# Patient Record
Sex: FEMALE | Race: WHITE | Employment: PART TIME | URBAN - METROPOLITAN AREA
[De-identification: names, ages, dates, MRNs, and addresses within clinical notes are randomized per-mention and may not be internally consistent; named-entity substitution may affect disease eponyms.]

---

## 2019-08-06 ENCOUNTER — OFFICE VISIT (OUTPATIENT)
Dept: ENT CLINIC | Age: 54
End: 2019-08-06
Payer: MEDICARE

## 2019-08-06 ENCOUNTER — PROCEDURE VISIT (OUTPATIENT)
Dept: AUDIOLOGY | Age: 54
End: 2019-08-06
Payer: MEDICARE

## 2019-08-06 VITALS
BODY MASS INDEX: 23 KG/M2 | HEIGHT: 62 IN | HEART RATE: 73 BPM | SYSTOLIC BLOOD PRESSURE: 127 MMHG | DIASTOLIC BLOOD PRESSURE: 67 MMHG | WEIGHT: 125 LBS

## 2019-08-06 DIAGNOSIS — H93.8X3 PRESSURE SENSATION IN BOTH EARS: ICD-10-CM

## 2019-08-06 DIAGNOSIS — R49.0 HOARSENESS: ICD-10-CM

## 2019-08-06 DIAGNOSIS — Z72.0 TOBACCO ABUSE: ICD-10-CM

## 2019-08-06 DIAGNOSIS — H69.83 ETD (EUSTACHIAN TUBE DYSFUNCTION), BILATERAL: Primary | ICD-10-CM

## 2019-08-06 PROCEDURE — 3017F COLORECTAL CA SCREEN DOC REV: CPT | Performed by: OTOLARYNGOLOGY

## 2019-08-06 PROCEDURE — G8427 DOCREV CUR MEDS BY ELIG CLIN: HCPCS | Performed by: OTOLARYNGOLOGY

## 2019-08-06 PROCEDURE — 4004F PT TOBACCO SCREEN RCVD TLK: CPT | Performed by: OTOLARYNGOLOGY

## 2019-08-06 PROCEDURE — 92567 TYMPANOMETRY: CPT | Performed by: AUDIOLOGIST

## 2019-08-06 PROCEDURE — 99204 OFFICE O/P NEW MOD 45 MIN: CPT | Performed by: OTOLARYNGOLOGY

## 2019-08-06 PROCEDURE — G8420 CALC BMI NORM PARAMETERS: HCPCS | Performed by: OTOLARYNGOLOGY

## 2019-08-06 RX ORDER — GABAPENTIN 100 MG/1
CAPSULE ORAL
Refills: 3 | COMMUNITY
Start: 2019-06-18

## 2019-08-06 RX ORDER — CHOLECALCIFEROL (VITAMIN D3) 125 MCG
CAPSULE ORAL
Refills: 3 | COMMUNITY
Start: 2019-07-19 | End: 2021-03-03 | Stop reason: SDUPTHER

## 2019-08-06 RX ORDER — VALACYCLOVIR HYDROCHLORIDE 500 MG/1
TABLET, FILM COATED ORAL
Refills: 3 | COMMUNITY
Start: 2019-05-29 | End: 2021-03-03 | Stop reason: SDUPTHER

## 2019-08-06 RX ORDER — MELOXICAM 15 MG/1
TABLET ORAL
Refills: 12 | COMMUNITY
Start: 2019-07-19 | End: 2021-03-19

## 2019-08-06 NOTE — PROGRESS NOTES
This patient was referred for tympanometric testing by Dr. Cynthia Malone due to bilateral ear pressure. Patient denied hearing loss, tinnitus and vertigo. Tympanometry revealed normal middle ear peak pressure and compliance, bilaterally. Ipsilateral acoustic reflexes were absent, bilaterally at 1000Hz. The results were reviewed with the patient. Recommendations for follow up will be made pending physician consult.     Erlinda Burgess

## 2019-08-06 NOTE — PROGRESS NOTES
Alert and oriented     Endoscopy Procedure Note    Pre-operative Diagnosis: Hoarseness  Post-operative Diagnosis: same  Indications: Hoarseness, dysphagia or aspiration - not able to be clearly evaluated by indirect laryngoscopy  Anesthesia: Lidocaine 4% and Baldemar-Synephrine 1/2%  Endoscopy Type:  Flexible nasopharyngolaryngoscopy  Procedure Details   The right side(s) of the nose was topically anesthetized with spray. The nasal cavities were inspected to determine which side would be more amenable to the scope being passed through. After waiting an appropriate period of time for anesthesia/ vasoconstriction to become effective, the flexible nasopharyngolaryngoscope was passed through the right side(s) of the nose, and the nose, nasopharynx, oropharynx, hypopharynx and larynx were examined. Examination was performed during quiet respiration and with phonation. The following findings were noted. Findings:  Normal nasopharynx, normal epiglottis, normal tongue base, normal pyriform, normal TVC motion and mucosa, no subglottic masses or lesions. Bilateral leukoplakic vs callus anterior TVC lesions. Erythema of supraglottic structures  Condition:  Stable  Complications:  None                          ASSESSMENTAND PLAN:                                                                                                  Diagnosis Orders   1. ETD (Eustachian tube dysfunction), bilateral     2. Hoarseness     3. Tobacco abuse         52 y.o. female presents with ETD as well as dysphonia. NPL showed diffuse mild erythema of supraglottic structures and thinning of vocal cords secondary to tobacco abuse    · Recommend smoking cessation. Patient states she will try  · Start Flonase   · Follow up in 3 month(s) or sooner if symptoms acutely exacerbate    Patient was seen and examined with attending physician, who agrees with the assessment andplans.     Danielle Sheikh DO  Resident Physician  [facility])  Otolaryngology

## 2021-01-18 ENCOUNTER — HOSPITAL ENCOUNTER (EMERGENCY)
Age: 56
Discharge: HOME | End: 2021-01-18
Attending: FAMILY MEDICINE
Payer: COMMERCIAL

## 2021-01-18 VITALS
WEIGHT: 115 LBS | RESPIRATION RATE: 16 BRPM | HEART RATE: 86 BPM | TEMPERATURE: 96.8 F | DIASTOLIC BLOOD PRESSURE: 82 MMHG | HEIGHT: 61 IN | BODY MASS INDEX: 21.71 KG/M2 | SYSTOLIC BLOOD PRESSURE: 142 MMHG | OXYGEN SATURATION: 98 %

## 2021-01-18 DIAGNOSIS — H66.90 ACUTE OTITIS MEDIA, UNSPECIFIED OTITIS MEDIA TYPE: Primary | ICD-10-CM

## 2021-01-18 DIAGNOSIS — H92.03 OTALGIA OF BOTH EARS: ICD-10-CM

## 2021-01-18 PROCEDURE — 99283 EMERGENCY DEPT VISIT LOW MDM: CPT

## 2021-01-18 RX ORDER — BROMPHENIRAMINE MALEATE, PSEUDOEPHEDRINE HYDROCHLORIDE, AND DEXTROMETHORPHAN HYDROBROMIDE 2; 30; 10 MG/5ML; MG/5ML; MG/5ML
5 SYRUP ORAL 4 TIMES DAILY PRN
Qty: 120 ML | Refills: 0 | Status: SHIPPED | OUTPATIENT
Start: 2021-01-18 | End: 2021-01-24

## 2021-01-18 RX ORDER — DOXYCYCLINE HYCLATE 100 MG
100 TABLET ORAL 2 TIMES DAILY
Qty: 20 TABLET | Refills: 0 | Status: SHIPPED | OUTPATIENT
Start: 2021-01-18 | End: 2021-01-28

## 2021-01-18 RX ORDER — IBUPROFEN 800 MG/1
800 TABLET ORAL EVERY 6 HOURS PRN
Qty: 21 TABLET | Refills: 0 | Status: SHIPPED | OUTPATIENT
Start: 2021-01-18 | End: 2021-03-03

## 2021-01-18 ASSESSMENT — PAIN DESCRIPTION - LOCATION: LOCATION: EAR

## 2021-01-18 ASSESSMENT — PAIN DESCRIPTION - PAIN TYPE: TYPE: CHRONIC PAIN

## 2021-01-18 NOTE — ED PROVIDER NOTES
[facility]    HPI: Denisa Orr is a 60 y.o. female with a past medical history of  has a past medical history of Arthritis, Atypical chest pain, Lateral epicondylitis  of elbow, Tendinitis of elbow, Tobacco abuse, and Vitamin D deficiency. presenting with complaints of a bilateral ear pain. The patient states that these symptoms began gradually. The history is obtained from the patient. The patient states that he has had some subjective chills at home. Patient does complain of a mild cough associated with it that is nonproductive. Patient denies excessive fatigue or sleeping greater than 18 hours a day. Patient denies exposure to mononucleosis. The patient denies any abdominal pain, left upper quadrant fullness, or early satiety. The patient also denies difficulty breathing, hemoptysis, neck pain/stiffness, or blurry vision. Sx have persisted and are mildly worse which is what prompted the visit today. No known exposure to sick contacts. Complaining of an onset of bilateral ear pain which she states began approximately 1 year ago. She does have a prescription for neomycin otic drops which she states is . States he typically uses the antibiotic drops as well as an antibiotic. She denies any nasal congestion rhinorrhea, body aches fevers or chills. Denies any concern for Covid exposure.        ROS:   Pertinent positives and negatives are stated within HPI, all other systems reviewed and are negative.      --------------------------------------------- PAST HISTORY ---------------------------------------------  Past Medical History:  has a past medical history of Arthritis, Atypical chest pain, Lateral epicondylitis  of elbow, Tendinitis of elbow, Tobacco abuse, and Vitamin D deficiency. Past Surgical History:  has a past surgical history that includes Appendectomy. Social History:  reports that she has been smoking cigarettes. She has a 15.00 pack-year smoking history.  She has never used smokeless tobacco. She reports current alcohol use. She reports that she does not use drugs. Family History: family history includes Other (age of onset: 48) in her mother. The patients home medications have been reviewed. Allergies: Penicillins    ------------------------- NURSING NOTES AND VITALS REVIEWED ---------------------------   The nursing notes within the ED encounter and vital signs as below have been reviewed by myself. BP (!) 142/82   Pulse 86   Temp 96.8 °F (36 °C) (Temporal)   Resp 16   Ht 5' 1\" (1.549 m)   Wt 115 lb (52.2 kg)   LMP 03/01/2001   SpO2 98%   BMI 21.73 kg/m²   Oxygen Saturation Interpretation: Normal    The patients available past medical records and past encounters were reviewed. Physical exam:  Constitutional: Vital signs are reviewed the patient is comfortable. The patient is alert and oriented and conversant. Head: The head is atraumatic and normocephalic. Eyes: No discharge is present from the eyes. The sclera are normal.  ENT: The oropharynx demonstrates a small amount of erythema bilaterally. There is no tonsillar enlargement nor is there any exudate present. No uvular deviation or edema. No tonsillary asymmetry.  Floor of the mouth soft, no trismus, handling secretions. TM bilaterally demonstrate mild erythema no visible exudate no hemotympanum noted. Neck: Normal range of motion is achieved in the neck. There is no JVD present. No meningeal signs are present   Anterior cervical adenopathy is normal.  Respiratory/chest: The chest is nontender. Breath sounds are normal. There is no respiratory distress.   Cardiovascular: Heart shows a regular rate and rhythm without murmurs clicks or gallops. Abdominal exam: The abdomen is non tender without evidence of peritoneal signs.  Specific attention to the left upper quadrant with palpation of the spleen demonstrates no organomegaly or tenderness  Skin: warm and dry, without rash  Neurologic: GCS

## 2021-03-03 ENCOUNTER — OFFICE VISIT (OUTPATIENT)
Dept: FAMILY MEDICINE CLINIC | Age: 56
End: 2021-03-03
Payer: COMMERCIAL

## 2021-03-03 VITALS — BODY MASS INDEX: 22.34 KG/M2 | OXYGEN SATURATION: 99 % | HEIGHT: 61 IN | WEIGHT: 118.3 LBS | RESPIRATION RATE: 18 BRPM

## 2021-03-03 DIAGNOSIS — B00.1 RECURRENT COLD SORES: ICD-10-CM

## 2021-03-03 DIAGNOSIS — M79.7 FIBROMYALGIA: Primary | ICD-10-CM

## 2021-03-03 DIAGNOSIS — Z72.0 TOBACCO USE: ICD-10-CM

## 2021-03-03 DIAGNOSIS — M54.2 NECK PAIN: ICD-10-CM

## 2021-03-03 DIAGNOSIS — Z12.31 SCREENING MAMMOGRAM, ENCOUNTER FOR: ICD-10-CM

## 2021-03-03 DIAGNOSIS — M25.571 ACUTE RIGHT ANKLE PAIN: ICD-10-CM

## 2021-03-03 DIAGNOSIS — E78.5 HYPERLIPIDEMIA, UNSPECIFIED HYPERLIPIDEMIA TYPE: ICD-10-CM

## 2021-03-03 DIAGNOSIS — E55.9 VITAMIN D DEFICIENCY: ICD-10-CM

## 2021-03-03 PROCEDURE — 99204 OFFICE O/P NEW MOD 45 MIN: CPT | Performed by: FAMILY MEDICINE

## 2021-03-03 PROCEDURE — G8420 CALC BMI NORM PARAMETERS: HCPCS | Performed by: FAMILY MEDICINE

## 2021-03-03 PROCEDURE — 3017F COLORECTAL CA SCREEN DOC REV: CPT | Performed by: FAMILY MEDICINE

## 2021-03-03 PROCEDURE — G8484 FLU IMMUNIZE NO ADMIN: HCPCS | Performed by: FAMILY MEDICINE

## 2021-03-03 PROCEDURE — 4004F PT TOBACCO SCREEN RCVD TLK: CPT | Performed by: FAMILY MEDICINE

## 2021-03-03 PROCEDURE — G8427 DOCREV CUR MEDS BY ELIG CLIN: HCPCS | Performed by: FAMILY MEDICINE

## 2021-03-03 RX ORDER — VALACYCLOVIR HYDROCHLORIDE 500 MG/1
TABLET, FILM COATED ORAL
Qty: 30 TABLET | Refills: 3 | Status: SHIPPED
Start: 2021-03-03 | End: 2021-07-08 | Stop reason: SDUPTHER

## 2021-03-03 RX ORDER — ALBUTEROL SULFATE 90 UG/1
2 AEROSOL, METERED RESPIRATORY (INHALATION) 4 TIMES DAILY PRN
Qty: 1 INHALER | Refills: 5 | Status: SHIPPED
Start: 2021-03-03 | End: 2021-07-08 | Stop reason: SDUPTHER

## 2021-03-03 RX ORDER — CHOLECALCIFEROL (VITAMIN D3) 125 MCG
CAPSULE ORAL
Qty: 30 TABLET | Refills: 3 | Status: SHIPPED
Start: 2021-03-03 | End: 2021-07-08 | Stop reason: SDUPTHER

## 2021-03-03 SDOH — ECONOMIC STABILITY: INCOME INSECURITY: HOW HARD IS IT FOR YOU TO PAY FOR THE VERY BASICS LIKE FOOD, HOUSING, MEDICAL CARE, AND HEATING?: NOT HARD AT ALL

## 2021-03-03 SDOH — ECONOMIC STABILITY: FOOD INSECURITY: WITHIN THE PAST 12 MONTHS, THE FOOD YOU BOUGHT JUST DIDN'T LAST AND YOU DIDN'T HAVE MONEY TO GET MORE.: NEVER TRUE

## 2021-03-03 ASSESSMENT — PATIENT HEALTH QUESTIONNAIRE - PHQ9
SUM OF ALL RESPONSES TO PHQ QUESTIONS 1-9: 2
2. FEELING DOWN, DEPRESSED OR HOPELESS: 1

## 2021-03-03 NOTE — PROGRESS NOTES
Natali Lindsey  : 1965    Chief Complaint:     Chief Complaint   Patient presents with   Jovita Blank Doctor    Ankle Pain     right ankle injury    Neck Pain     small cracks in neck, not too much pain       HPI  Natali Lindsey 60 y.o. presents for   Chief Complaint   Patient presents with   Jovita Blank Doctor    Ankle Pain     right ankle injury    Neck Pain     small cracks in neck, not too much pain     Patient presents as a new patient to establish care. She states that she hurt her ankle about a week ago where it twisted. She states that it is black and blue. It does hurt however she has been taking good care of it. She also has a history of fibromyalgia and does follow with rheumatology. She also has a history of cold sores and usually takes Valtrex on a daily basis to prevent these. She also admits to some neck pain that comes and goes. It is slightly improved recently. She also does smoke does not wish to quit at this time. She has not had blood work in some time. She has had a history of high cholesterol and vitamin D deficiency. She does take vitamin D on a daily basis    All questions were answered to patients satisfaction.     Past Medical History:   Diagnosis Date    Arthritis     Atypical chest pain 2011    Lateral epicondylitis  of elbow 2011    Tendinitis of elbow 2011    Tobacco abuse 2011    Vitamin D deficiency 2011       Past Surgical History:   Procedure Laterality Date    APPENDECTOMY         Social History     Socioeconomic History    Marital status:      Spouse name: None    Number of children: None    Years of education: None    Highest education level: None   Occupational History    None   Social Needs    Financial resource strain: Not hard at all   [place] insecurity     Worry: Never true     Inability: Never true    Transportation needs     Medical: No     Non-medical: No   Tobacco Use    Smoking status: Current Every Day Smoker     Packs/day: 0.50     Years: 30.00     Pack years: 15.00     Types: Cigarettes    Smokeless tobacco: Never Used   Substance and Sexual Activity    Alcohol use: Yes     Comment: occasional beer    Drug use: No    Sexual activity: None   Lifestyle    Physical activity     Days per week: None     Minutes per session: None    Stress: None   Relationships    Social connections     Talks on phone: None     Gets together: None     Attends Zoroastrian service: None     Active member of club or organization: None     Attends meetings of clubs or organizations: None     Relationship status: None    Intimate partner violence     Fear of current or ex partner: None     Emotionally abused: None     Physically abused: None     Forced sexual activity: None   Other Topics Concern    None   Social History Narrative    None       Family History   Problem Relation Age of Onset    Other Mother 48         of MI at age of 48, Lupus          Current Outpatient Medications   Medication Sig Dispense Refill    valACYclovir (VALTREX) 500 MG tablet Take one tab po daily 30 tablet 3    Cholecalciferol (VITAMIN D3) 50 MCG (2000 UT) TABS TK 1 T PO QD 30 tablet 3    albuterol sulfate HFA (VENTOLIN HFA) 108 (90 Base) MCG/ACT inhaler Inhale 2 puffs into the lungs 4 times daily as needed for Wheezing 1 Inhaler 5    gabapentin (NEURONTIN) 100 MG capsule TK 2 CS PO TID  3    meloxicam (MOBIC) 15 MG tablet TK 1 T PO QD  12    methotrexate (RHEUMATREX) 2.5 MG chemo tablet TK 6 TS PO 1 TIME Q WK  4    naproxen (NAPROSYN) 500 MG tablet Take 1 tablet by mouth 2 times daily for 10 days. 20 tablet 0    aspirin 81 MG tablet Take 162 mg by mouth daily.  cyclobenzaprine (FLEXERIL) 10 MG tablet Take 10 mg by mouth 3 times daily as needed for Muscle spasms. No current facility-administered medications for this visit.         Allergies   Allergen Reactions    [employer] Abdomen is soft. Tenderness: There is no abdominal tenderness. Musculoskeletal: Normal range of motion. General: Tenderness (Right ankle slightly edematous and painful to palpation with ecchymoses noted) present. Lymphadenopathy:      Cervical: No cervical adenopathy. Skin:     General: Skin is warm and dry. Findings: No rash. Neurological:      Mental Status: She is alert and oriented to person, place, and time. Deep Tendon Reflexes: Reflexes are normal and symmetric. Psychiatric:         Behavior: Behavior normal.              Laboratory: All laboratory and radiology results have been personally reviewed by myself    Lab Results   Component Value Date     11/24/2014    K 4.8 11/24/2014     11/24/2014    CO2 27 11/24/2014    BUN 10 11/24/2014    CREATININE 0.5 11/24/2014    PROT 7.4 11/24/2014    LABALBU 4.4 11/24/2014    CALCIUM 9.8 11/24/2014    GFRAA >60 11/24/2014    LABGLOM >60 11/24/2014    GLUCOSE 105 11/24/2014    AST 22 11/24/2014    ALT 36 11/24/2014    ALKPHOS 82 11/24/2014    BILITOT 0.2 11/24/2014    TSH 1.510 10/03/2014    VITD25 18 10/03/2014    CHOL 234 10/03/2014    TRIG 209 10/03/2014    HDL 79 10/03/2014    LDLCALC 113 10/03/2014        Lab Results   Component Value Date    CHOL 234 (H) 10/03/2014     Lab Results   Component Value Date    TRIG 209 (H) 10/03/2014     Lab Results   Component Value Date    HDL 79 10/03/2014     Lab Results   Component Value Date    LDLCALC 113 (H) 10/03/2014       No results found for: LABA1C  Lab Results   Component Value Date    LDLCALC 113 (H) 10/03/2014    CREATININE 0.5 11/24/2014       ASSESSMENT/PLAN:     Diagnosis Orders   1. Fibromyalgia     2. Vitamin D deficiency  Vitamin D 25 Hydroxy   3. Hyperlipidemia, unspecified hyperlipidemia type  Lipid Panel   4. Recurrent cold sores     5. Acute right ankle pain  XR ANKLE RIGHT (MIN 3 VIEWS)   6. Neck pain  XR CERVICAL SPINE (2-3 VIEWS)   7. Tobacco use     8. Screening mammogram, encounter for  VASILIY DIGITAL SCREEN BILATERAL PER PROTOCOL     Likely does have a component of COPD as her lung exam does show significant wheezing. Albuterol inhaler given today to use as needed. She does not wish for a daily inhaler at this time. We did discuss smoking cessation she does not wish to quit at this time. As for fibromyalgia continue to follow with rheumatology as directed. Valtrex given for cold sores. Labs to be completed at her convenience. X-ray of the right ankle as well as cervical spine to be completed at her convenience as well. Mammogram ordered. Patient will return in 1 month or sooner if needed    Problem list reviewed andsimplified/updated  HM reviewed today and counseled as appropriate    Call or go to ED immediately if symptoms worsen or persist.  Future Appointments   Date Time Provider Adam Root   4/6/2021  2:45 PM [facility], [facility]     Or sooner if necessary. Educational materials and/or homeexercises printed for patient's review and were included in patient instructions on his/her After Visit Summary and given to patient at the end of visit.       Counseled regarding above diagnosis, including possible risks and complications,  especially if left uncontrolled.     Counseled regarding the possible side effects, risks, benefits and alternatives to treatment; patient and/or guardian verbalizes understanding, agrees,feels comfortable with and wishes to proceed with above treatment plan.     Advised patient to call Nolon Rings new medication issues, and read all Rx info from pharmacy to assure aware of all possible risks and side effects of medication before taking.     Reviewed age and gender appropriate health screening exams and vaccinations.   Advised patient regarding importance of keeping up with recommended health maintenance and toschedule as soon as possible if overdue, as this is important in assessing for undiagnosed

## 2021-03-04 ENCOUNTER — HOSPITAL ENCOUNTER (EMERGENCY)
Age: 56
Discharge: HOME | End: 2021-03-04
Payer: COMMERCIAL

## 2021-03-04 ENCOUNTER — HOSPITAL ENCOUNTER (OUTPATIENT)
Age: 56
Discharge: HOME | End: 2021-03-06
Payer: COMMERCIAL

## 2021-03-04 ENCOUNTER — HOSPITAL ENCOUNTER (OUTPATIENT)
Dept: GENERAL RADIOLOGY | Age: 56
Discharge: HOME | End: 2021-03-06
Payer: COMMERCIAL

## 2021-03-04 VITALS
WEIGHT: 109 LBS | OXYGEN SATURATION: 100 % | HEIGHT: 62 IN | HEART RATE: 55 BPM | BODY MASS INDEX: 20.06 KG/M2 | DIASTOLIC BLOOD PRESSURE: 89 MMHG | RESPIRATION RATE: 16 BRPM | SYSTOLIC BLOOD PRESSURE: 138 MMHG | TEMPERATURE: 97.1 F

## 2021-03-04 DIAGNOSIS — M54.2 NECK PAIN: ICD-10-CM

## 2021-03-04 DIAGNOSIS — M25.571 ACUTE RIGHT ANKLE PAIN: ICD-10-CM

## 2021-03-04 DIAGNOSIS — S82.831A CLOSED FRACTURE OF DISTAL END OF RIGHT FIBULA, UNSPECIFIED FRACTURE MORPHOLOGY, INITIAL ENCOUNTER: Primary | ICD-10-CM

## 2021-03-04 PROCEDURE — 99284 EMERGENCY DEPT VISIT MOD MDM: CPT

## 2021-03-04 PROCEDURE — 72040 X-RAY EXAM NECK SPINE 2-3 VW: CPT

## 2021-03-04 PROCEDURE — 73610 X-RAY EXAM OF ANKLE: CPT

## 2021-03-04 RX ORDER — NAPROXEN 500 MG/1
500 TABLET ORAL 2 TIMES DAILY
Qty: 20 TABLET | Refills: 0 | Status: SHIPPED | OUTPATIENT
Start: 2021-03-04 | End: 2021-04-06 | Stop reason: ALTCHOICE

## 2021-03-04 ASSESSMENT — ENCOUNTER SYMPTOMS
BACK PAIN: 0
CONSTIPATION: 0
SORE THROAT: 0
SHORTNESS OF BREATH: 0
SINUS PRESSURE: 0
BACK PAIN: 0
COUGH: 0
ABDOMINAL PAIN: 0
COUGH: 0
SHORTNESS OF BREATH: 0
DIARRHEA: 0
EYE PAIN: 0
COLOR CHANGE: 0
CHEST TIGHTNESS: 0

## 2021-03-04 ASSESSMENT — PAIN DESCRIPTION - DESCRIPTORS: DESCRIPTORS: ACHING;SHARP

## 2021-03-04 ASSESSMENT — PAIN DESCRIPTION - PAIN TYPE: TYPE: ACUTE PAIN

## 2021-03-04 ASSESSMENT — PAIN DESCRIPTION - LOCATION: LOCATION: ANKLE

## 2021-03-04 NOTE — ED PROVIDER NOTES
[facility]        Department of Emergency Medicine   ED  Provider Note  Admit Date/RoomTime: 3/4/2021  2:50 PM  ED Room: 03/03  HPI:  3/4/21, Time: 5:19 PM EST      The patient is a 66-year-old female sent to the emergency department by her primary care physician due to concerns for right ankle fracture. Patient states a week ago she \"got her leg stuck in a snow bank\" at the [place]. She states she thought she just sprained it and has been walking on it since. She states it bruised from her ankle all the way down to her toes. Patient states she had an appointment with her PCP today and had an outpatient x-ray which showed a fracture. Patient has been walking on it without issue and has not been take anything for pain. She denies any redness, warmth, numbness or tingling in the toes, coolness to the toes, foot pain. The history is provided by the patient. No  was used. REVIEW OF SYSTEMS:  Review of Systems   Constitutional: Negative for activity change, chills, fatigue and fever. Respiratory: Negative for cough, chest tightness and shortness of breath. Cardiovascular: Negative for chest pain, palpitations and leg swelling. Musculoskeletal: Positive for arthralgias and joint swelling. Negative for back pain, myalgias, neck pain and neck stiffness. Skin: Negative for color change, pallor and rash. Neurological: Negative for dizziness, weakness, light-headedness and headaches. Psychiatric/Behavioral: Negative for agitation, behavioral problems and confusion. Pertinent positives and negatives are stated within HPI, all other systems reviewed and are negative.      --------------------------------------------- PAST HISTORY ---------------------------------------------  Past Medical History:  has a past medical history of Arthritis, Atypical chest pain, Lateral epicondylitis  of elbow, Tendinitis of elbow, Tobacco abuse, and Vitamin D deficiency. Past Surgical History:  has a past surgical history that includes Appendectomy. Social History:  reports that she has been smoking cigarettes. She has a 15.00 pack-year smoking history. She has never used smokeless tobacco. She reports current alcohol use. She reports that she does not use drugs. Family History: family history includes Other (age of onset: 48) in her mother. The patients home medications have been reviewed. Allergies: Penicillins    -------------------------------------------------- RESULTS -------------------------------------------------  All laboratory and radiology results have been personally reviewed by myself   LABS:  No results found for this visit on 03/04/21. RADIOLOGY:  Interpreted by Radiologist.  No orders to display       ------------------------- NURSING NOTES AND VITALS REVIEWED ---------------------------   The nursing notes within the ED encounter and vital signs as below have been reviewed. /89   Pulse 55   Temp 97.1 °F (36.2 °C) (Temporal)   Resp 16   Ht 5' 2\" (1.575 m)   Wt 109 lb (49.4 kg)   LMP 03/01/2001   SpO2 100%   BMI 19.94 kg/m²   Oxygen Saturation Interpretation: Normal      ---------------------------------------------------PHYSICAL EXAM--------------------------------------    Physical Exam  Vitals signs and nursing note reviewed. Constitutional:       General: She is not in acute distress. Appearance: Normal appearance. She is well-developed. She is not ill-appearing. HENT:      Head: Normocephalic and atraumatic. Mouth/Throat:      Pharynx: Oropharynx is clear. Neck:      Vascular: No JVD. Trachea: No tracheal deviation. Cardiovascular:      Rate and Rhythm: Normal rate and regular rhythm. Heart sounds: Normal heart sounds. No murmur. Pulmonary:      Effort: Pulmonary effort is normal. No respiratory distress. Breath sounds: Normal breath sounds. Musculoskeletal: Normal range of motion. General: Swelling and tenderness present. No deformity. Comments: Tenderness and edema over the right lateral malleolus with extensive aging ecchymosis from the ankle to the distal foot sparing the toes. 2+ DP pulse. Normal cap refill. Full range of motion of the ankle without crepitus, laxity or deformity. Skin:     General: Skin is warm and dry. Capillary Refill: Capillary refill takes less than 2 seconds. Coloration: Skin is not pale. Findings: No erythema. Neurological:      Mental Status: She is alert and oriented to person, place, and time. Mental status is at baseline. Motor: No weakness. Psychiatric:         Mood and Affect: Mood normal.         Behavior: Behavior normal.         Thought Content: Thought content normal.            ------------------------------ ED COURSE/MEDICAL DECISION MAKING----------------------  Medications - No data to display      ED COURSE:      No orders to display         Procedures:  Procedures     Medical Decision Making:   MDM   58-year-old female sent to the emergency department by her PCP for a distal fibula fracture seen on outpatient x-ray from earlier today. Patient sustained the injury over a week ago. She has no signs of neurovascular compromise. The x-ray does show a slightly displaced distal fibular fracture. Patient was placed in a posterior short leg splint and given crutches although she is adamant that she will not use the crutches and she will be walking on it as she is worried she will fall using crutches. Patient is given a prescription for naproxen for the pain and encouraged to follow-up with her podiatrist as soon as possible. Patient discharged home in good condition.       Counseling: The emergency provider has spoken with the patient and discussed todays results, in addition to providing specific details for the plan of care and counseling regarding the diagnosis and prognosis. Questions are answered at this time and they are agreeable with the plan.      --------------------------------- IMPRESSION AND DISPOSITION ---------------------------------    IMPRESSION  1. Closed fracture of distal end of right fibula, unspecified fracture morphology, initial encounter        DISPOSITION  Disposition: Discharge to home  Patient condition is good      Electronically signed by Karin Weeks PA-C   DD: 3/4/21  **This report was transcribed using voice recognition software. Every effort was made to ensure accuracy; however, inadvertent computerized transcription errors may be present.   END OF ED PROVIDER NOTE         Karin Weeks PA-C  03/04/21 [id number]

## 2021-03-05 ENCOUNTER — TELEPHONE (OUTPATIENT)
Dept: FAMILY MEDICINE CLINIC | Age: 56
End: 2021-03-05

## 2021-03-18 ENCOUNTER — TELEPHONE (OUTPATIENT)
Dept: FAMILY MEDICINE CLINIC | Age: 56
End: 2021-03-18

## 2021-03-18 NOTE — TELEPHONE ENCOUNTER
Pt's sister calling and states pt is having severe neck pain and can't move her head. Provider not in office advised ER. Voiced understanding.

## 2021-03-19 ENCOUNTER — HOSPITAL ENCOUNTER (EMERGENCY)
Age: 56
Discharge: HOME | End: 2021-03-19
Attending: EMERGENCY MEDICINE
Payer: COMMERCIAL

## 2021-03-19 VITALS
HEART RATE: 89 BPM | DIASTOLIC BLOOD PRESSURE: 82 MMHG | HEIGHT: 62 IN | RESPIRATION RATE: 16 BRPM | TEMPERATURE: 96.6 F | OXYGEN SATURATION: 95 % | WEIGHT: 110 LBS | SYSTOLIC BLOOD PRESSURE: 130 MMHG | BODY MASS INDEX: 20.24 KG/M2

## 2021-03-19 DIAGNOSIS — M62.838 NECK MUSCLE SPASM: Primary | ICD-10-CM

## 2021-03-19 DIAGNOSIS — M47.812 CERVICAL SPINE ARTHRITIS: ICD-10-CM

## 2021-03-19 PROCEDURE — 6360000002 HC RX W HCPCS: Performed by: EMERGENCY MEDICINE

## 2021-03-19 PROCEDURE — 96372 THER/PROPH/DIAG INJ SC/IM: CPT

## 2021-03-19 PROCEDURE — 99283 EMERGENCY DEPT VISIT LOW MDM: CPT

## 2021-03-19 RX ORDER — IBUPROFEN 800 MG/1
800 TABLET ORAL EVERY 8 HOURS PRN
Qty: 12 TABLET | Refills: 0 | Status: SHIPPED | OUTPATIENT
Start: 2021-03-19 | End: 2021-04-06 | Stop reason: ALTCHOICE

## 2021-03-19 RX ORDER — ORPHENADRINE CITRATE 30 MG/ML
60 INJECTION INTRAMUSCULAR; INTRAVENOUS ONCE
Status: COMPLETED | OUTPATIENT
Start: 2021-03-19 | End: 2021-03-19

## 2021-03-19 RX ORDER — CYCLOBENZAPRINE HCL 10 MG
10 TABLET ORAL NIGHTLY PRN
Qty: 10 TABLET | Refills: 0 | Status: SHIPPED | OUTPATIENT
Start: 2021-03-19 | End: 2021-04-06

## 2021-03-19 RX ORDER — KETOROLAC TROMETHAMINE 30 MG/ML
30 INJECTION, SOLUTION INTRAMUSCULAR; INTRAVENOUS ONCE
Status: COMPLETED | OUTPATIENT
Start: 2021-03-19 | End: 2021-03-19

## 2021-03-19 RX ADMIN — KETOROLAC TROMETHAMINE 30 MG: 30 INJECTION, SOLUTION INTRAMUSCULAR at 08:30

## 2021-03-19 RX ADMIN — ORPHENADRINE CITRATE 60 MG: 60 INJECTION INTRAMUSCULAR; INTRAVENOUS at 08:30

## 2021-03-19 ASSESSMENT — PAIN DESCRIPTION - ONSET: ONSET: ON-GOING

## 2021-03-19 ASSESSMENT — PAIN DESCRIPTION - DESCRIPTORS: DESCRIPTORS: DISCOMFORT

## 2021-03-19 ASSESSMENT — PAIN DESCRIPTION - FREQUENCY: FREQUENCY: CONTINUOUS

## 2021-03-19 ASSESSMENT — PAIN SCALES - GENERAL
PAINLEVEL_OUTOF10: 8
PAINLEVEL_OUTOF10: 8

## 2021-03-19 ASSESSMENT — PAIN DESCRIPTION - PAIN TYPE: TYPE: ACUTE PAIN

## 2021-03-19 ASSESSMENT — PAIN DESCRIPTION - ORIENTATION: ORIENTATION: RIGHT;LEFT

## 2021-03-19 ASSESSMENT — PAIN - FUNCTIONAL ASSESSMENT: PAIN_FUNCTIONAL_ASSESSMENT: PREVENTS OR INTERFERES SOME ACTIVE ACTIVITIES AND ADLS

## 2021-03-19 NOTE — ED PROVIDER NOTES
HPI:  3/19/21, Time: 8:27 AM EDT         Venancio Sunshine is a 60 y.o. female presenting to the ED for neck pain (bilateral) of a chronic nature, but worse in past 2 days. Patient states 2 days ago she awoke with bilateral neck spasm and pain, worse if she moves left to right. She hasn't taken anything for it over the counter. Was taking Ultram given to her for her ankle fracture by podiatrist but she is out of that now. She saw her PCP for neck pain a couple weeks ago and had an xray that showed arthritis. She also sees a Rheumatologist for arthritis and fibromyalgia. She denies trauma, heavy lifting, fever or chills, headache, nausea or vomiting, focal paresthesias, focal weakness, neck or back pain, sore throat, infectious symptoms or other complaints. States she needs pain medication and has a ride home, wants paper prescriptions today because her sister is driving to the other side of [facility]. The complaint has been persistent, moderate in severity, and worsened by movement. Patient denies fever/chills, sore throat, cough, congestion, chest pain, shortness of breath, edema, headache, visual disturbances, focal paresthesias, focal weakness, abdominal pain, nausea, vomiting, diarrhea, constipation, dysuria, hematuria, trauma, back pain, rash or other complaints. ROS:   A complete review of systems was performed and all pertinent positives and negatives are stated within HPI, all other systems reviewed and are negative.      --------------------------------------------- PAST HISTORY ---------------------------------------------  Past Medical History:  has a past medical history of Arthritis, Atypical chest pain, Fibromyalgia, Lateral epicondylitis  of elbow, Tendinitis of elbow, Tobacco abuse, and Vitamin D deficiency. Past Surgical History:  has a past surgical history that includes Appendectomy. Social History:  reports that she has been smoking cigarettes.  She has a 15.00 pack-year smoking history. She has never used smokeless tobacco. She reports current alcohol use. She reports that she does not use drugs. Family History: family history includes Other (age of onset: 48) in her mother. The patients home medications have been reviewed. Allergies: Penicillins        ----------------------------------------PHYSICAL EXAM--------------------------------------  Constitutional:  Well developed, well nourished, no acute distress, non-toxic appearance   Eyes:  PERRL, conjunctiva normal, EOMI  HENT:  Atraumatic, external ears normal, nose normal, oropharynx moist, no pharyngeal exudates. TMs clear and intact bilaterally. Neck- normal range of motion, no nuchal rigidity   Respiratory:  No respiratory distress, normal breath sounds, no rales, no wheezing   Cardiovascular:  Normal rate, normal rhythm, no murmurs, no gallops, no rubs. Radial pulses 2+ bilaterally. GI:  Soft, nondistended, normal bowel sounds, nontender, no organomegaly, no mass, no rebound, no guarding   :  No costovertebral angle tenderness   Musculoskeletal:  No edema, no tenderness, no deformities. Back-no midline or paraspinal  thoracic or lumbar tenderness. No midline cervical tenderness. No step-offs. Chest able. Pelvis stable. Moves all 4 extremities without difficulty or pain. Patient has bilateral paraspinal neck muscle spasm noted with tenderness to palpation. Integument:  Well hydrated, no visible rash. Adequate perfusion. Lymphatic:  No cervical lymphadenopathy noted   Neurologic:  Alert & oriented x 3, CN 2-12 normal, normal motor function, normal sensory function, no focal deficits noted. DTRs 2+ bilateral patellar and brachial radialis. . Normal gait. Psychiatric:  Speech and behavior appropriate       -------------------------------------------------- RESULTS -------------------------------------------------  I have personally reviewed all laboratory and imaging results for this patient.  Results are listed below.     LABS:  No results found for this visit on 03/19/21. RADIOLOGY:  Interpreted by Radiologist.  No orders to display       ------------------------- NURSING NOTES AND VITALS REVIEWED ---------------------------  The nursing notes within the ED encounter and vital signs as below have been reviewed by myself. /82   Pulse 89   Temp 96.6 °F (35.9 °C) (Infrared)   Resp 16   Ht 5' 2\" (1.575 m)   Wt 110 lb (49.9 kg)   LMP 03/01/2001   SpO2 95%   BMI 20.12 kg/m²   Oxygen Saturation Interpretation: Normal      The patients available past medical records and past encounters were reviewed. ------------------------------ ED COURSE/MEDICAL DECISION MAKING----------------------  Medications   ketorolac (TORADOL) injection 30 mg (30 mg Intramuscular Given 3/19/21 0830)   orphenadrine (NORFLEX) injection 60 mg (60 mg Intramuscular Given 3/19/21 0830)           Procedures:   none      Medical Decision Making:    Cervical muscle spasm. Neurovascularly intact. No trauma. Nothing infectious. Very musculoskeletal in presentation. Follow-up with rheumatology and primary care physician. If this persists will need further imaging such as MRI. Safe Flexeril use and driving restrictions reviewed. Patient was explicitly instructed on specific signs and symptoms on which to return to the emergency room for. Patient was instructed to return to the ER for any new or worsening symptoms. Additional discharge instructions were given verbally. All questions were answered. Patient is comfortable and agreeable with discharge plan. Patient in no acute distress and non-toxic in appearance. This patient's ED course included: a personal history and physicial eaxmination    This patient has remained hemodynamically stable during their ED course. David Little, DO, am the Primary Provider of Record    Counseling:   The emergency provider has spoken with the patient and sister and discussed todays results, in addition to providing specific details for the plan of care and counseling regarding the diagnosis and prognosis. Questions are answered at this time and they are agreeable with the plan.    --------------------------- IMPRESSION AND DISPOSITION ---------------------------------    IMPRESSION  1. Neck muscle spasm    2.  Cervical spine arthritis        DISPOSITION  Disposition: Discharge to home  Patient condition is stable             Desirae Miller DO  03/19/21 0840

## 2021-04-06 ENCOUNTER — OFFICE VISIT (OUTPATIENT)
Dept: FAMILY MEDICINE CLINIC | Age: 56
End: 2021-04-06
Payer: COMMERCIAL

## 2021-04-06 VITALS
BODY MASS INDEX: 20.28 KG/M2 | RESPIRATION RATE: 22 BRPM | HEART RATE: 100 BPM | SYSTOLIC BLOOD PRESSURE: 124 MMHG | DIASTOLIC BLOOD PRESSURE: 76 MMHG | HEIGHT: 62 IN | OXYGEN SATURATION: 96 % | WEIGHT: 110.2 LBS

## 2021-04-06 DIAGNOSIS — E55.9 VITAMIN D DEFICIENCY: ICD-10-CM

## 2021-04-06 DIAGNOSIS — E78.5 HYPERLIPIDEMIA, UNSPECIFIED HYPERLIPIDEMIA TYPE: ICD-10-CM

## 2021-04-06 DIAGNOSIS — M79.7 FIBROMYALGIA: Primary | ICD-10-CM

## 2021-04-06 DIAGNOSIS — Z12.11 SCREENING FOR MALIGNANT NEOPLASM OF COLON: ICD-10-CM

## 2021-04-06 DIAGNOSIS — M54.2 NECK PAIN: ICD-10-CM

## 2021-04-06 DIAGNOSIS — Z72.0 TOBACCO USE: ICD-10-CM

## 2021-04-06 PROCEDURE — 3017F COLORECTAL CA SCREEN DOC REV: CPT | Performed by: FAMILY MEDICINE

## 2021-04-06 PROCEDURE — 99214 OFFICE O/P EST MOD 30 MIN: CPT | Performed by: FAMILY MEDICINE

## 2021-04-06 PROCEDURE — 4004F PT TOBACCO SCREEN RCVD TLK: CPT | Performed by: FAMILY MEDICINE

## 2021-04-06 PROCEDURE — G8427 DOCREV CUR MEDS BY ELIG CLIN: HCPCS | Performed by: FAMILY MEDICINE

## 2021-04-06 PROCEDURE — G8420 CALC BMI NORM PARAMETERS: HCPCS | Performed by: FAMILY MEDICINE

## 2021-04-06 RX ORDER — BACLOFEN 10 MG/1
TABLET ORAL
Qty: 30 TABLET | Refills: 0 | Status: SHIPPED
Start: 2021-04-06 | End: 2021-07-08 | Stop reason: SDUPTHER

## 2021-04-06 RX ORDER — HYDROXYCHLOROQUINE SULFATE 200 MG/1
TABLET, FILM COATED ORAL
COMMUNITY
Start: 2021-03-04

## 2021-04-06 RX ORDER — FOLIC ACID 1 MG/1
TABLET ORAL
COMMUNITY
Start: 2021-03-04 | End: 2021-07-08 | Stop reason: DRUGHIGH

## 2021-04-06 ASSESSMENT — ENCOUNTER SYMPTOMS
ABDOMINAL PAIN: 0
DIARRHEA: 0
CONSTIPATION: 0
SINUS PRESSURE: 0
EYE PAIN: 0
BACK PAIN: 0
SORE THROAT: 0
COUGH: 0
SHORTNESS OF BREATH: 0

## 2021-04-06 NOTE — PROGRESS NOTES
Bear Peace  : 1965    Chief Complaint:     Chief Complaint   Patient presents with    Neck Pain       HPI  Bear Peace 60 y.o. presents for   Chief Complaint   Patient presents with    Neck Pain     Patient presents for follow-up today. She states that she continues have some pain in her neck. She has not been back to see her rheumatologist yet. She states that she has not been able to drive due to her fractured foot. She is scared to take too much of the gabapentin but she was told that she can take more than what she is taking. She also did not get blood work completed that was ordered at last visit. She does continue to smoke but has cut back. She is willing to complete mammogram and Cologuard at her convenience. All questions were answered to patients satisfaction.     Past Medical History:   Diagnosis Date    Arthritis     Atypical chest pain 2011    Fibromyalgia     Lateral epicondylitis  of elbow 2011    Tendinitis of elbow 2011    Tobacco abuse 2011    Vitamin D deficiency 2011       Past Surgical History:   Procedure Laterality Date    APPENDECTOMY         Social History     Socioeconomic History    Marital status:      Spouse name: None    Number of children: None    Years of education: None    Highest education level: None   Occupational History    None   Social Needs    Financial resource strain: Not hard at all   Coretta[place] insecurity     Worry: Never true     Inability: Never true    Transportation needs     Medical: No     Non-medical: No   Tobacco Use    Smoking status: Current Every Day Smoker     Packs/day: 0.50     Years: 30.00     Pack years: 15.00     Types: Cigarettes    Smokeless tobacco: Never Used   Substance and Sexual Activity    Alcohol use: Yes     Comment: occasional beer    Drug use: No    Sexual activity: None   Lifestyle    Physical activity     Days per week: None     Minutes per session: None    Stress: None   Relationships    Social connections     Talks on phone: None     Gets together: None     Attends Temple service: None     Active member of club or organization: None     Attends meetings of clubs or organizations: None     Relationship status: None    Intimate partner violence     Fear of current or ex partner: None     Emotionally abused: None     Physically abused: None     Forced sexual activity: None   Other Topics Concern    None   Social History Narrative    None       Family History   Problem Relation Age of Onset    Other Mother 48         of MI at age of 48, Lupus          Current Outpatient Medications   Medication Sig Dispense Refill    hydroxychloroquine (PLAQUENIL) 200 MG tablet       folic acid (FOLVITE) 1 MG tablet       baclofen (LIORESAL) 10 MG tablet Take one tab po bid prn for spasms 30 tablet 0    valACYclovir (VALTREX) 500 MG tablet Take one tab po daily 30 tablet 3    Cholecalciferol (VITAMIN D3) 50 MCG (2000 UT) TABS TK 1 T PO QD 30 tablet 3    albuterol sulfate HFA (VENTOLIN HFA) 108 (90 Base) MCG/ACT inhaler Inhale 2 puffs into the lungs 4 times daily as needed for Wheezing 1 Inhaler 5    gabapentin (NEURONTIN) 100 MG capsule TK 2 CS PO TID  3     No current facility-administered medications for this visit. Allergies   Allergen Reactions    Penicillins Hives    Simvastatin Other (See Comments)       Health Maintenance Due   Topic Date Due    Hepatitis C screen  Never done    HIV screen  Never done    COVID-19 Vaccine (1) Never done    Cervical cancer screen  Never done    Colon cancer screen colonoscopy  Never done    Pneumococcal 0-64 years Vaccine (1 of 1 - PPSV23) 2017    Annual Wellness Visit (AWV)  Never done    Lipid screen  10/03/2019    Breast cancer screen  2020           REVIEW OF SYSTEMS  Review of Systems   Constitutional: Negative for fatigue and fever.    HENT: Negative for ear pain, sinus pressure, sneezing and sore throat. Eyes: Negative for pain. Respiratory: Negative for cough and shortness of breath. Cardiovascular: Negative for chest pain and leg swelling. Gastrointestinal: Negative for abdominal pain, constipation and diarrhea. Genitourinary: Negative for dysuria and urgency. Musculoskeletal: Positive for neck pain. Negative for back pain and myalgias. Right ankle pain improved   Skin: Negative for rash. Allergic/Immunologic: Negative for food allergies. Neurological: Negative for light-headedness and headaches. Hematological: Does not bruise/bleed easily. Psychiatric/Behavioral: Negative for behavioral problems and sleep disturbance. PHYSICAL EXAM  /76   Pulse 100   Resp 22   Ht 5' 2\" (1.575 m)   Wt 110 lb 3.2 oz (50 kg)   LMP 03/01/2001   SpO2 96%   BMI 20.16 kg/m²   Physical Exam  Vitals signs and nursing note reviewed. Constitutional:       Appearance: She is well-developed. HENT:      Head: Normocephalic and atraumatic. Right Ear: External ear normal.      Left Ear: External ear normal.      Nose: Nose normal.   Eyes:      Conjunctiva/sclera: Conjunctivae normal.   Neck:      Musculoskeletal: Normal range of motion and neck supple. Thyroid: No thyromegaly. Cardiovascular:      Rate and Rhythm: Normal rate and regular rhythm. Heart sounds: Normal heart sounds. No murmur. Pulmonary:      Effort: Pulmonary effort is normal.      Breath sounds: No wheezing. Abdominal:      Palpations: Abdomen is soft. Tenderness: There is no abdominal tenderness. Musculoskeletal: Normal range of motion. General: No tenderness. Lymphadenopathy:      Cervical: No cervical adenopathy. Skin:     General: Skin is warm and dry. Findings: No rash. Neurological:      Mental Status: She is alert and oriented to person, place, and time. Deep Tendon Reflexes: Reflexes are normal and symmetric.    Psychiatric:         Behavior: Behavior normal.              Laboratory: All laboratory and radiology results have been personally reviewed by myself    Lab Results   Component Value Date     11/24/2014    K 4.8 11/24/2014     11/24/2014    CO2 27 11/24/2014    BUN 10 11/24/2014    CREATININE 0.5 11/24/2014    PROT 7.4 11/24/2014    LABALBU 4.4 11/24/2014    CALCIUM 9.8 11/24/2014    GFRAA >60 11/24/2014    LABGLOM >60 11/24/2014    GLUCOSE 105 11/24/2014    AST 22 11/24/2014    ALT 36 11/24/2014    ALKPHOS 82 11/24/2014    BILITOT 0.2 11/24/2014    TSH 1.510 10/03/2014    VITD25 18 10/03/2014    CHOL 234 10/03/2014    TRIG 209 10/03/2014    HDL 79 10/03/2014    LDLCALC 113 10/03/2014        Lab Results   Component Value Date    CHOL 234 (H) 10/03/2014     Lab Results   Component Value Date    TRIG 209 (H) 10/03/2014     Lab Results   Component Value Date    HDL 79 10/03/2014     Lab Results   Component Value Date    LDLCALC 113 (H) 10/03/2014       No results found for: LABA1C  Lab Results   Component Value Date    LDLCALC 113 (H) 10/03/2014    CREATININE 0.5 11/24/2014       ASSESSMENT/PLAN:     Diagnosis Orders   1. Fibromyalgia     2. Vitamin D deficiency     3. Hyperlipidemia, unspecified hyperlipidemia type     4. Tobacco use     5. Neck pain     6. Screening for malignant neoplasm of colon  Cologuard (For External Results Only)     Smoking cessation discussed today. She does not wish to quit at this time. As for neck pain she will continue to follow with rheumatology as x-ray did show some arthritis but otherwise was normal.  Labs to be completed at her convenience. Cologuard and mammogram recommended and she will complete at her convenience as well. Baclofen sent into use as needed. Side effects medication reviewed with patient.   Follow-up in 3 months or sooner if needed    Problem list reviewed andsimplified/updated  HM reviewed today and counseled as appropriate    Call or go to ED immediately if symptoms worsen or persist.  Future Appointments   Date Time Provider Adam Corcorani   7/8/2021 10:30 AM DO Donnell Muse [facility]     Or sooner if necessary. Educational materials and/or homeexercises printed for patient's review and were included in patient instructions on his/her After Visit Summary and given to patient at the end of visit.       Counseled regarding above diagnosis, including possible risks and complications,  especially if left uncontrolled.     Counseled regarding the possible side effects, risks, benefits and alternatives to treatment; patient and/or guardian verbalizes understanding, agrees,feels comfortable with and wishes to proceed with above treatment plan.     Advised patient to call [facility] Bevr new medication issues, and read all Rx info from pharmacy to assure aware of all possible risks and side effects of medication before taking.     Reviewed age and gender appropriate health screening exams and vaccinations. Advised patient regarding importance of keeping up with recommended health maintenance and toschedule as soon as possible if overdue, as this is important in assessing for undiagnosed pathology, especially cancer, as well as protecting against potentially harmful/life threatening disease.          Patient and/or guardian verbalizes understanding and agrees with above counseling, assessment and plan.     All questions answered. Gianni Vanegas DO  4/6/21    NOTE: This report was transcribed using voice recognition software.  Every effort was made to ensure accuracy; however, inadvertent computerized transcription errors may be present

## 2021-05-28 ENCOUNTER — TELEPHONE (OUTPATIENT)
Dept: FAMILY MEDICINE CLINIC | Age: 56
End: 2021-05-28

## 2021-05-28 NOTE — TELEPHONE ENCOUNTER
Message left asking the patient to call me to schedule her mammography screening with the [facility] that will be in [place] on 6/25/21 ([phone]).     Chula PASTRANA, RIlirTIlir (R) (T)  [facility], [address] [place] [address]

## 2021-06-03 ENCOUNTER — HOSPITAL ENCOUNTER (OUTPATIENT)
Dept: MAMMOGRAPHY | Age: 56
Discharge: HOME | End: 2021-06-05
Payer: COMMERCIAL

## 2021-06-03 DIAGNOSIS — Z12.31 SCREENING MAMMOGRAM, ENCOUNTER FOR: ICD-10-CM

## 2021-06-03 PROCEDURE — 77063 BREAST TOMOSYNTHESIS BI: CPT

## 2021-06-29 ENCOUNTER — HOSPITAL ENCOUNTER (INPATIENT)
Age: 56
LOS: 1 days | Discharge: HOME | DRG: 897 | End: 2021-07-02
Attending: EMERGENCY MEDICINE | Admitting: INTERNAL MEDICINE
Payer: COMMERCIAL

## 2021-06-29 ENCOUNTER — APPOINTMENT (OUTPATIENT)
Dept: ULTRASOUND IMAGING | Age: 56
DRG: 897 | End: 2021-06-29
Payer: COMMERCIAL

## 2021-06-29 ENCOUNTER — APPOINTMENT (OUTPATIENT)
Dept: GENERAL RADIOLOGY | Age: 56
DRG: 897 | End: 2021-06-29
Payer: COMMERCIAL

## 2021-06-29 DIAGNOSIS — E87.8 HYPOCHLOREMIA: ICD-10-CM

## 2021-06-29 DIAGNOSIS — R27.0 ATAXIA: ICD-10-CM

## 2021-06-29 DIAGNOSIS — F10.20 CEREBELLAR ATAXIA DUE TO ALCOHOL (HCC): ICD-10-CM

## 2021-06-29 DIAGNOSIS — G32.81 CEREBELLAR ATAXIA DUE TO ALCOHOL (HCC): ICD-10-CM

## 2021-06-29 DIAGNOSIS — M25.579 ANKLE PAIN, UNSPECIFIED CHRONICITY, UNSPECIFIED LATERALITY: ICD-10-CM

## 2021-06-29 DIAGNOSIS — F10.10 ALCOHOL ABUSE: ICD-10-CM

## 2021-06-29 DIAGNOSIS — M77.8 TENDINITIS OF ELBOW: ICD-10-CM

## 2021-06-29 DIAGNOSIS — E86.0 DEHYDRATION: Primary | ICD-10-CM

## 2021-06-29 DIAGNOSIS — E83.42 HYPOMAGNESEMIA: ICD-10-CM

## 2021-06-29 LAB
ALBUMIN SERPL-MCNC: 4.4 G/DL (ref 3.5–5.2)
ALP BLD-CCNC: 169 U/L (ref 35–104)
ALT SERPL-CCNC: 48 U/L (ref 0–32)
ANION GAP SERPL CALCULATED.3IONS-SCNC: 35 MMOL/L (ref 7–16)
AST SERPL-CCNC: 79 U/L (ref 0–31)
BASOPHILS ABSOLUTE: 0 E9/L (ref 0–0.2)
BASOPHILS RELATIVE PERCENT: 0.4 % (ref 0–2)
BILIRUB SERPL-MCNC: 2 MG/DL (ref 0–1.2)
BILIRUBIN URINE: ABNORMAL
BLOOD, URINE: NEGATIVE
BUN BLDV-MCNC: 7 MG/DL (ref 6–20)
BURR CELLS: ABNORMAL
CALCIUM SERPL-MCNC: 9 MG/DL (ref 8.6–10.2)
CHLORIDE BLD-SCNC: 92 MMOL/L (ref 98–107)
CLARITY: ABNORMAL
CO2: 8 MMOL/L (ref 22–29)
COLOR: ABNORMAL
CREAT SERPL-MCNC: 0.5 MG/DL (ref 0.5–1)
EOSINOPHILS ABSOLUTE: 0 E9/L (ref 0.05–0.5)
EOSINOPHILS RELATIVE PERCENT: 0 % (ref 0–6)
GFR AFRICAN AMERICAN: >60
GFR NON-AFRICAN AMERICAN: >60 ML/MIN/1.73
GLUCOSE BLD-MCNC: 192 MG/DL (ref 74–99)
GLUCOSE URINE: NEGATIVE MG/DL
HCT VFR BLD CALC: 34.4 % (ref 34–48)
HEMOGLOBIN: 11.3 G/DL (ref 11.5–15.5)
KETONES, URINE: >=80 MG/DL
LEUKOCYTE ESTERASE, URINE: NEGATIVE
LYMPHOCYTES ABSOLUTE: 0.36 E9/L (ref 1.5–4)
LYMPHOCYTES RELATIVE PERCENT: 13.9 % (ref 20–42)
MAGNESIUM: 1.3 MG/DL (ref 1.6–2.6)
MCH RBC QN AUTO: 36.1 PG (ref 26–35)
MCHC RBC AUTO-ENTMCNC: 32.8 % (ref 32–34.5)
MCV RBC AUTO: 109.9 FL (ref 80–99.9)
MONOCYTES ABSOLUTE: 0.05 E9/L (ref 0.1–0.95)
MONOCYTES RELATIVE PERCENT: 1.7 % (ref 2–12)
NEUTROPHILS ABSOLUTE: 2.18 E9/L (ref 1.8–7.3)
NEUTROPHILS RELATIVE PERCENT: 84.3 % (ref 43–80)
NITRITE, URINE: NEGATIVE
OVALOCYTES: ABNORMAL
PDW BLD-RTO: 12.6 FL (ref 11.5–15)
PH UA: 6 (ref 5–9)
PHOSPHORUS: 2.3 MG/DL (ref 2.5–4.5)
PLATELET # BLD: 134 E9/L (ref 130–450)
PMV BLD AUTO: 10.4 FL (ref 7–12)
POIKILOCYTES: ABNORMAL
POLYCHROMASIA: ABNORMAL
POTASSIUM REFLEX MAGNESIUM: 5 MMOL/L (ref 3.5–5)
PROTEIN UA: 30 MG/DL
RBC # BLD: 3.13 E12/L (ref 3.5–5.5)
SODIUM BLD-SCNC: 135 MMOL/L (ref 132–146)
SPECIFIC GRAVITY UA: >=1.03 (ref 1–1.03)
TARGET CELLS: ABNORMAL
TOTAL PROTEIN: 7.4 G/DL (ref 6.4–8.3)
TROPONIN, HIGH SENSITIVITY: <6 NG/L (ref 0–9)
UROBILINOGEN, URINE: 1 E.U./DL
WBC # BLD: 2.6 E9/L (ref 4.5–11.5)

## 2021-06-29 PROCEDURE — 83735 ASSAY OF MAGNESIUM: CPT

## 2021-06-29 PROCEDURE — 93005 ELECTROCARDIOGRAM TRACING: CPT | Performed by: STUDENT IN AN ORGANIZED HEALTH CARE EDUCATION/TRAINING PROGRAM

## 2021-06-29 PROCEDURE — 96365 THER/PROPH/DIAG IV INF INIT: CPT

## 2021-06-29 PROCEDURE — 99284 EMERGENCY DEPT VISIT MOD MDM: CPT

## 2021-06-29 PROCEDURE — 2580000003 HC RX 258: Performed by: STUDENT IN AN ORGANIZED HEALTH CARE EDUCATION/TRAINING PROGRAM

## 2021-06-29 PROCEDURE — 84484 ASSAY OF TROPONIN QUANT: CPT

## 2021-06-29 PROCEDURE — 76705 ECHO EXAM OF ABDOMEN: CPT

## 2021-06-29 PROCEDURE — 96361 HYDRATE IV INFUSION ADD-ON: CPT

## 2021-06-29 PROCEDURE — 71045 X-RAY EXAM CHEST 1 VIEW: CPT

## 2021-06-29 PROCEDURE — 84100 ASSAY OF PHOSPHORUS: CPT

## 2021-06-29 PROCEDURE — 81001 URINALYSIS AUTO W/SCOPE: CPT

## 2021-06-29 PROCEDURE — 6370000000 HC RX 637 (ALT 250 FOR IP): Performed by: STUDENT IN AN ORGANIZED HEALTH CARE EDUCATION/TRAINING PROGRAM

## 2021-06-29 PROCEDURE — 80053 COMPREHEN METABOLIC PANEL: CPT

## 2021-06-29 PROCEDURE — 85025 COMPLETE CBC W/AUTO DIFF WBC: CPT

## 2021-06-29 PROCEDURE — 6360000002 HC RX W HCPCS: Performed by: STUDENT IN AN ORGANIZED HEALTH CARE EDUCATION/TRAINING PROGRAM

## 2021-06-29 PROCEDURE — 96375 TX/PRO/DX INJ NEW DRUG ADDON: CPT

## 2021-06-29 RX ORDER — M-VIT,TX,IRON,MINS/CALC/FOLIC 27MG-0.4MG
1 TABLET ORAL DAILY
Status: COMPLETED | OUTPATIENT
Start: 2021-06-29 | End: 2021-06-29

## 2021-06-29 RX ORDER — THIAMINE HYDROCHLORIDE 100 MG/ML
100 INJECTION, SOLUTION INTRAMUSCULAR; INTRAVENOUS ONCE
Status: COMPLETED | OUTPATIENT
Start: 2021-06-29 | End: 2021-06-29

## 2021-06-29 RX ORDER — MAGNESIUM SULFATE IN WATER 40 MG/ML
2000 INJECTION, SOLUTION INTRAVENOUS ONCE
Status: COMPLETED | OUTPATIENT
Start: 2021-06-29 | End: 2021-06-30

## 2021-06-29 RX ORDER — FOLIC ACID 1 MG/1
1 TABLET ORAL DAILY
Status: DISCONTINUED | OUTPATIENT
Start: 2021-06-30 | End: 2021-06-29

## 2021-06-29 RX ORDER — FOLIC ACID 1 MG/1
1 TABLET ORAL ONCE
Status: COMPLETED | OUTPATIENT
Start: 2021-06-29 | End: 2021-06-29

## 2021-06-29 RX ORDER — M-VIT,TX,IRON,MINS/CALC/FOLIC 27MG-0.4MG
1 TABLET ORAL DAILY
Status: DISCONTINUED | OUTPATIENT
Start: 2021-06-30 | End: 2021-06-29

## 2021-06-29 RX ORDER — 0.9 % SODIUM CHLORIDE 0.9 %
1000 INTRAVENOUS SOLUTION INTRAVENOUS ONCE
Status: COMPLETED | OUTPATIENT
Start: 2021-06-29 | End: 2021-06-29

## 2021-06-29 RX ORDER — SODIUM CHLORIDE 0.9 % (FLUSH) 0.9 %
5-40 SYRINGE (ML) INJECTION PRN
Status: DISCONTINUED | OUTPATIENT
Start: 2021-06-29 | End: 2021-06-29

## 2021-06-29 RX ADMIN — THIAMINE HYDROCHLORIDE 100 MG: 100 INJECTION, SOLUTION INTRAMUSCULAR; INTRAVENOUS at 21:15

## 2021-06-29 RX ADMIN — SODIUM CHLORIDE 1000 ML: 9 INJECTION, SOLUTION INTRAVENOUS at 22:35

## 2021-06-29 RX ADMIN — FOLIC ACID 1 MG: 1 TABLET ORAL at 22:15

## 2021-06-29 RX ADMIN — SODIUM CHLORIDE 1000 ML: 9 INJECTION, SOLUTION INTRAVENOUS at 21:15

## 2021-06-29 RX ADMIN — Medication 1 TABLET: at 22:16

## 2021-06-29 RX ADMIN — MAGNESIUM SULFATE HEPTAHYDRATE 2000 MG: 40 INJECTION, SOLUTION INTRAVENOUS at 22:51

## 2021-06-29 ASSESSMENT — ENCOUNTER SYMPTOMS
VOMITING: 1
ABDOMINAL PAIN: 0
BACK PAIN: 0
COUGH: 0
NAUSEA: 1
SHORTNESS OF BREATH: 0

## 2021-06-30 PROBLEM — E86.0 DEHYDRATION: Status: ACTIVE | Noted: 2021-06-30

## 2021-06-30 LAB
ANION GAP SERPL CALCULATED.3IONS-SCNC: 14 MMOL/L (ref 7–16)
ANION GAP SERPL CALCULATED.3IONS-SCNC: 15 MMOL/L (ref 7–16)
BACTERIA: NORMAL /HPF
BUN BLDV-MCNC: 8 MG/DL (ref 6–20)
BUN BLDV-MCNC: 9 MG/DL (ref 6–20)
CALCIUM SERPL-MCNC: 8.2 MG/DL (ref 8.6–10.2)
CALCIUM SERPL-MCNC: 8.2 MG/DL (ref 8.6–10.2)
CHLORIDE BLD-SCNC: 105 MMOL/L (ref 98–107)
CHLORIDE BLD-SCNC: 106 MMOL/L (ref 98–107)
CO2: 19 MMOL/L (ref 22–29)
CO2: 22 MMOL/L (ref 22–29)
CREAT SERPL-MCNC: 0.4 MG/DL (ref 0.5–1)
CREAT SERPL-MCNC: 0.4 MG/DL (ref 0.5–1)
EPITHELIAL CELLS, UA: NORMAL /HPF
GFR AFRICAN AMERICAN: >60
GFR AFRICAN AMERICAN: >60
GFR NON-AFRICAN AMERICAN: >60 ML/MIN/1.73
GFR NON-AFRICAN AMERICAN: >60 ML/MIN/1.73
GLUCOSE BLD-MCNC: 118 MG/DL (ref 74–99)
GLUCOSE BLD-MCNC: 88 MG/DL (ref 74–99)
MAGNESIUM: 1.4 MG/DL (ref 1.6–2.6)
MAGNESIUM: 1.6 MG/DL (ref 1.6–2.6)
PHOSPHORUS: 0.4 MG/DL (ref 2.5–4.5)
PHOSPHORUS: 0.4 MG/DL (ref 2.5–4.5)
POTASSIUM REFLEX MAGNESIUM: 2.7 MMOL/L (ref 3.5–5)
POTASSIUM SERPL-SCNC: 2.8 MMOL/L (ref 3.5–5)
RBC UA: NORMAL /HPF (ref 0–2)
SODIUM BLD-SCNC: 139 MMOL/L (ref 132–146)
SODIUM BLD-SCNC: 142 MMOL/L (ref 132–146)
WBC UA: NORMAL /HPF (ref 0–5)

## 2021-06-30 PROCEDURE — 84100 ASSAY OF PHOSPHORUS: CPT

## 2021-06-30 PROCEDURE — 99219 PR INITIAL OBSERVATION CARE/DAY 50 MINUTES: CPT | Performed by: INTERNAL MEDICINE

## 2021-06-30 PROCEDURE — 6360000002 HC RX W HCPCS: Performed by: INTERNAL MEDICINE

## 2021-06-30 PROCEDURE — 96376 TX/PRO/DX INJ SAME DRUG ADON: CPT

## 2021-06-30 PROCEDURE — 96375 TX/PRO/DX INJ NEW DRUG ADDON: CPT

## 2021-06-30 PROCEDURE — 2580000003 HC RX 258: Performed by: INTERNAL MEDICINE

## 2021-06-30 PROCEDURE — 2500000003 HC RX 250 WO HCPCS: Performed by: INTERNAL MEDICINE

## 2021-06-30 PROCEDURE — G0378 HOSPITAL OBSERVATION PER HR: HCPCS

## 2021-06-30 PROCEDURE — 96372 THER/PROPH/DIAG INJ SC/IM: CPT

## 2021-06-30 PROCEDURE — 96366 THER/PROPH/DIAG IV INF ADDON: CPT

## 2021-06-30 PROCEDURE — 6370000000 HC RX 637 (ALT 250 FOR IP): Performed by: INTERNAL MEDICINE

## 2021-06-30 PROCEDURE — 83735 ASSAY OF MAGNESIUM: CPT

## 2021-06-30 PROCEDURE — 96361 HYDRATE IV INFUSION ADD-ON: CPT

## 2021-06-30 PROCEDURE — 36415 COLL VENOUS BLD VENIPUNCTURE: CPT

## 2021-06-30 PROCEDURE — 80048 BASIC METABOLIC PNL TOTAL CA: CPT

## 2021-06-30 PROCEDURE — 96368 THER/DIAG CONCURRENT INF: CPT

## 2021-06-30 RX ORDER — MAGNESIUM SULFATE 1 G/100ML
1000 INJECTION INTRAVENOUS PRN
Status: DISCONTINUED | OUTPATIENT
Start: 2021-06-30 | End: 2021-07-02 | Stop reason: HOSPADM

## 2021-06-30 RX ORDER — SODIUM CHLORIDE 0.9 % (FLUSH) 0.9 %
5-40 SYRINGE (ML) INJECTION EVERY 12 HOURS SCHEDULED
Status: DISCONTINUED | OUTPATIENT
Start: 2021-06-30 | End: 2021-07-02 | Stop reason: HOSPADM

## 2021-06-30 RX ORDER — POTASSIUM CHLORIDE 20 MEQ/1
40 TABLET, EXTENDED RELEASE ORAL PRN
Status: DISCONTINUED | OUTPATIENT
Start: 2021-06-30 | End: 2021-07-02 | Stop reason: HOSPADM

## 2021-06-30 RX ORDER — SODIUM CHLORIDE 9 MG/ML
INJECTION, SOLUTION INTRAVENOUS CONTINUOUS
Status: ACTIVE | OUTPATIENT
Start: 2021-06-30 | End: 2021-06-30

## 2021-06-30 RX ORDER — ACETAMINOPHEN 650 MG/1
650 SUPPOSITORY RECTAL EVERY 6 HOURS PRN
Status: DISCONTINUED | OUTPATIENT
Start: 2021-06-30 | End: 2021-07-02 | Stop reason: HOSPADM

## 2021-06-30 RX ORDER — SODIUM CHLORIDE 9 MG/ML
25 INJECTION, SOLUTION INTRAVENOUS PRN
Status: DISCONTINUED | OUTPATIENT
Start: 2021-06-30 | End: 2021-07-02 | Stop reason: HOSPADM

## 2021-06-30 RX ORDER — LORAZEPAM 1 MG/1
3 TABLET ORAL
Status: DISCONTINUED | OUTPATIENT
Start: 2021-06-30 | End: 2021-07-02 | Stop reason: HOSPADM

## 2021-06-30 RX ORDER — LORAZEPAM 2 MG/ML
3 INJECTION INTRAMUSCULAR
Status: DISCONTINUED | OUTPATIENT
Start: 2021-06-30 | End: 2021-07-02 | Stop reason: HOSPADM

## 2021-06-30 RX ORDER — SODIUM CHLORIDE 0.9 % (FLUSH) 0.9 %
5-40 SYRINGE (ML) INJECTION PRN
Status: DISCONTINUED | OUTPATIENT
Start: 2021-06-30 | End: 2021-07-02 | Stop reason: HOSPADM

## 2021-06-30 RX ORDER — GABAPENTIN 100 MG/1
200 CAPSULE ORAL 3 TIMES DAILY
Status: DISCONTINUED | OUTPATIENT
Start: 2021-06-30 | End: 2021-07-02 | Stop reason: HOSPADM

## 2021-06-30 RX ORDER — ONDANSETRON 4 MG/1
4 TABLET, ORALLY DISINTEGRATING ORAL EVERY 8 HOURS PRN
Status: DISCONTINUED | OUTPATIENT
Start: 2021-06-30 | End: 2021-07-02 | Stop reason: HOSPADM

## 2021-06-30 RX ORDER — POLYETHYLENE GLYCOL 3350 17 G/17G
17 POWDER, FOR SOLUTION ORAL DAILY PRN
Status: DISCONTINUED | OUTPATIENT
Start: 2021-06-30 | End: 2021-07-02 | Stop reason: HOSPADM

## 2021-06-30 RX ORDER — GAUZE BANDAGE 2" X 2"
100 BANDAGE TOPICAL DAILY
Status: DISCONTINUED | OUTPATIENT
Start: 2021-06-30 | End: 2021-07-02 | Stop reason: HOSPADM

## 2021-06-30 RX ORDER — OXYCODONE HYDROCHLORIDE 5 MG/1
5 TABLET ORAL EVERY 4 HOURS PRN
Status: DISCONTINUED | OUTPATIENT
Start: 2021-06-30 | End: 2021-07-02 | Stop reason: HOSPADM

## 2021-06-30 RX ORDER — LORAZEPAM 1 MG/1
2 TABLET ORAL
Status: DISCONTINUED | OUTPATIENT
Start: 2021-06-30 | End: 2021-07-02 | Stop reason: HOSPADM

## 2021-06-30 RX ORDER — M-VIT,TX,IRON,MINS/CALC/FOLIC 27MG-0.4MG
1 TABLET ORAL DAILY
Status: DISCONTINUED | OUTPATIENT
Start: 2021-06-30 | End: 2021-07-02 | Stop reason: HOSPADM

## 2021-06-30 RX ORDER — ACETAMINOPHEN 325 MG/1
650 TABLET ORAL EVERY 6 HOURS PRN
Status: DISCONTINUED | OUTPATIENT
Start: 2021-06-30 | End: 2021-07-02 | Stop reason: HOSPADM

## 2021-06-30 RX ORDER — ONDANSETRON 2 MG/ML
4 INJECTION INTRAMUSCULAR; INTRAVENOUS EVERY 6 HOURS PRN
Status: DISCONTINUED | OUTPATIENT
Start: 2021-06-30 | End: 2021-07-02 | Stop reason: HOSPADM

## 2021-06-30 RX ORDER — LORAZEPAM 2 MG/ML
2 INJECTION INTRAMUSCULAR
Status: DISCONTINUED | OUTPATIENT
Start: 2021-06-30 | End: 2021-07-02 | Stop reason: HOSPADM

## 2021-06-30 RX ORDER — POTASSIUM CHLORIDE 7.45 MG/ML
10 INJECTION INTRAVENOUS
Status: DISPENSED | OUTPATIENT
Start: 2021-06-30 | End: 2021-07-01

## 2021-06-30 RX ORDER — HYDROXYCHLOROQUINE SULFATE 200 MG/1
200 TABLET, FILM COATED ORAL
Status: DISCONTINUED | OUTPATIENT
Start: 2021-06-30 | End: 2021-07-02 | Stop reason: HOSPADM

## 2021-06-30 RX ORDER — LORAZEPAM 1 MG/1
1 TABLET ORAL
Status: DISCONTINUED | OUTPATIENT
Start: 2021-06-30 | End: 2021-07-02 | Stop reason: HOSPADM

## 2021-06-30 RX ORDER — LORAZEPAM 2 MG/ML
4 INJECTION INTRAMUSCULAR
Status: DISCONTINUED | OUTPATIENT
Start: 2021-06-30 | End: 2021-07-02 | Stop reason: HOSPADM

## 2021-06-30 RX ORDER — LORAZEPAM 2 MG/ML
1 INJECTION INTRAMUSCULAR
Status: DISCONTINUED | OUTPATIENT
Start: 2021-06-30 | End: 2021-07-02 | Stop reason: HOSPADM

## 2021-06-30 RX ORDER — POTASSIUM CHLORIDE 7.45 MG/ML
10 INJECTION INTRAVENOUS PRN
Status: DISCONTINUED | OUTPATIENT
Start: 2021-06-30 | End: 2021-07-02 | Stop reason: HOSPADM

## 2021-06-30 RX ORDER — FOLIC ACID 1 MG/1
1 TABLET ORAL DAILY
Status: DISCONTINUED | OUTPATIENT
Start: 2021-06-30 | End: 2021-07-02 | Stop reason: HOSPADM

## 2021-06-30 RX ORDER — LORAZEPAM 1 MG/1
4 TABLET ORAL
Status: DISCONTINUED | OUTPATIENT
Start: 2021-06-30 | End: 2021-07-02 | Stop reason: HOSPADM

## 2021-06-30 RX ADMIN — SODIUM CHLORIDE: 9 INJECTION, SOLUTION INTRAVENOUS at 01:27

## 2021-06-30 RX ADMIN — Medication 10 ML: at 09:15

## 2021-06-30 RX ADMIN — SODIUM CHLORIDE 25 ML: 9 INJECTION, SOLUTION INTRAVENOUS at 23:10

## 2021-06-30 RX ADMIN — OXYCODONE HYDROCHLORIDE 5 MG: 5 TABLET ORAL at 21:40

## 2021-06-30 RX ADMIN — GABAPENTIN 200 MG: 100 CAPSULE ORAL at 21:40

## 2021-06-30 RX ADMIN — SODIUM CHLORIDE: 9 INJECTION, SOLUTION INTRAVENOUS at 12:21

## 2021-06-30 RX ADMIN — MAGNESIUM SULFATE 1000 MG: 1 INJECTION INTRAVENOUS at 22:57

## 2021-06-30 RX ADMIN — POTASSIUM BICARBONATE 40 MEQ: 782 TABLET, EFFERVESCENT ORAL at 21:39

## 2021-06-30 RX ADMIN — THIAMINE HCL TAB 100 MG 100 MG: 100 TAB at 09:02

## 2021-06-30 RX ADMIN — POTASSIUM CHLORIDE 10 MEQ: 10 INJECTION, SOLUTION INTRAVENOUS at 21:43

## 2021-06-30 RX ADMIN — SODIUM BICARBONATE 50 MEQ: 84 INJECTION, SOLUTION INTRAVENOUS at 12:27

## 2021-06-30 RX ADMIN — LORAZEPAM 1 MG: 2 INJECTION INTRAMUSCULAR; INTRAVENOUS at 02:18

## 2021-06-30 RX ADMIN — ENOXAPARIN SODIUM 40 MG: 40 INJECTION SUBCUTANEOUS at 09:02

## 2021-06-30 RX ADMIN — POTASSIUM CHLORIDE 10 MEQ: 10 INJECTION, SOLUTION INTRAVENOUS at 23:11

## 2021-06-30 RX ADMIN — SODIUM PHOSPHATE, MONOBASIC, MONOHYDRATE 16 MMOL: 276; 142 INJECTION, SOLUTION INTRAVENOUS at 23:40

## 2021-06-30 RX ADMIN — GABAPENTIN 200 MG: 100 CAPSULE ORAL at 09:03

## 2021-06-30 RX ADMIN — Medication 1 TABLET: at 09:03

## 2021-06-30 RX ADMIN — HYDROXYCHLOROQUINE SULFATE 200 MG: 200 TABLET ORAL at 09:03

## 2021-06-30 RX ADMIN — FOLIC ACID 1 MG: 1 TABLET ORAL at 09:02

## 2021-06-30 ASSESSMENT — PAIN DESCRIPTION - DESCRIPTORS
DESCRIPTORS: ACHING;SHOOTING
DESCRIPTORS: ACHING;SHOOTING;DISCOMFORT

## 2021-06-30 ASSESSMENT — PAIN DESCRIPTION - LOCATION
LOCATION: LEG
LOCATION: LEG

## 2021-06-30 ASSESSMENT — PAIN SCALES - GENERAL
PAINLEVEL_OUTOF10: 7
PAINLEVEL_OUTOF10: 8
PAINLEVEL_OUTOF10: 7

## 2021-06-30 ASSESSMENT — PAIN DESCRIPTION - PAIN TYPE
TYPE: ACUTE PAIN
TYPE: CHRONIC PAIN;OTHER (COMMENT)

## 2021-06-30 ASSESSMENT — PAIN DESCRIPTION - ORIENTATION
ORIENTATION: RIGHT;LEFT
ORIENTATION: RIGHT;LEFT

## 2021-06-30 NOTE — ED PROVIDER NOTES
HPI   78-year-old female patient presented to emergency department chief complaint of fatigue. Patient has history of alcohol abuse. States she has been drinking about a pint of vodka a day for the last couple of years. Patient states she is now tired of drinking and abruptly stopped on Saturday. Has not had a drink since then. Denies suicidal or homicidal ideation. Patient has never been DTs or alcohol withdrawal. Not having any pain. She does note nausea and vomiting few days, as well as associated decreased p.o. intake food and water. Symptoms are constant and worsening, moderate in severity. Review of Systems   Constitutional: Positive for fatigue. Negative for chills. HENT: Negative for congestion. Respiratory: Negative for cough and shortness of breath. Cardiovascular: Negative for chest pain. Gastrointestinal: Positive for nausea and vomiting. Negative for abdominal pain. Genitourinary: Negative for difficulty urinating and dysuria. Musculoskeletal: Negative for back pain. Neurological:        No shakes   All other systems reviewed and are negative. Physical Exam  Vitals and nursing note reviewed. Constitutional:       Appearance: Normal appearance. HENT:      Head: Normocephalic and atraumatic. Nose: Nose normal. No congestion. Mouth/Throat:      Mouth: Mucous membranes are moist.      Pharynx: Oropharynx is clear. Eyes:      Conjunctiva/sclera: Conjunctivae normal.      Pupils: Pupils are equal, round, and reactive to light. Cardiovascular:      Rate and Rhythm: Normal rate and regular rhythm. Pulses: Normal pulses. Heart sounds: Normal heart sounds. Pulmonary:      Effort: Pulmonary effort is normal. No respiratory distress. Breath sounds: Normal breath sounds. Abdominal:      General: Bowel sounds are normal. There is no distension. Tenderness: There is no abdominal tenderness. Musculoskeletal:         General: Normal range of motion. Cervical back: Normal range of motion and neck supple. Skin:     General: Skin is warm and dry. Capillary Refill: Capillary refill takes less than 2 seconds. Neurological:      General: No focal deficit present. Mental Status: She is alert. Motor: No weakness. Comments: No asterixis. Psychiatric:      Comments: No hallucinations, suicidal ideation or homicidal ideation          Procedures     MDM   78-year-old female patient presented to emergency department after quitting alcohol few days prior. Patient complaining of fatigue and weakness. Lab work showing patient is severely dehydrated. Patient also with elevated LFTs. Right upper quadrant ultrasound negative for acute findings. Patient here given thiamine, folic acid, multivitamin, magnesium repleted and fluids were given. Patient does have CIWA score of 10. At this time patient to be admitted for fluid and electrolyte repletion as well as alcohol withdrawal.  Here not in any acute distress not hallucinating and oriented x4. EKG: This EKG is signed and interpreted by me. Rate: 100  Rhythm: Sinus  Interpretation: no acute changes  Comparison: stable as compared to patient's most recent EKG            --------------------------------------------- PAST HISTORY ---------------------------------------------  Past Medical History:  has a past medical history of Arthritis, Atypical chest pain, Fibromyalgia, Lateral epicondylitis  of elbow, Tendinitis of elbow, Tobacco abuse, and Vitamin D deficiency. Past Surgical History:  has a past surgical history that includes Appendectomy and Tubal ligation (Bilateral). Social History:  reports that she has been smoking cigarettes. She has a 15.00 pack-year smoking history. She has never used smokeless tobacco. She reports current alcohol use. She reports that she does not use drugs. Family History: family history includes Other (age of onset: 48) in her mother.      The patients home medications have been reviewed.     Allergies: Penicillins and Simvastatin    -------------------------------------------------- RESULTS -------------------------------------------------    LABS:  Results for orders placed or performed during the hospital encounter of 06/29/21   CBC Auto Differential   Result Value Ref Range    WBC 2.6 (L) 4.5 - 11.5 E9/L    RBC 3.13 (L) 3.50 - 5.50 E12/L    Hemoglobin 11.3 (L) 11.5 - 15.5 g/dL    Hematocrit 34.4 34.0 - 48.0 %    .9 (H) 80.0 - 99.9 fL    MCH 36.1 (H) 26.0 - 35.0 pg    MCHC 32.8 32.0 - 34.5 %    RDW 12.6 11.5 - 15.0 fL    Platelets 150 637 - 914 E9/L    MPV 10.4 7.0 - 12.0 fL    Neutrophils % 84.3 (H) 43.0 - 80.0 %    Lymphocytes % 13.9 (L) 20.0 - 42.0 %    Monocytes % 1.7 (L) 2.0 - 12.0 %    Eosinophils % 0.0 0.0 - 6.0 %    Basophils % 0.4 0.0 - 2.0 %    Neutrophils Absolute 2.18 1.80 - 7.30 E9/L    Lymphocytes Absolute 0.36 (L) 1.50 - 4.00 E9/L    Monocytes Absolute 0.05 (L) 0.10 - 0.95 E9/L    Eosinophils Absolute 0.00 (L) 0.05 - 0.50 E9/L    Basophils Absolute 0.00 0.00 - 0.20 E9/L    Polychromasia 1+     Poikilocytes 1+     Ovidio Cells 1+     Ovalocytes 1+     Target Cells 1+    Comprehensive Metabolic Panel w/ Reflex to MG   Result Value Ref Range    Sodium 135 132 - 146 mmol/L    Potassium reflex Magnesium 5.0 3.5 - 5.0 mmol/L    Chloride 92 (L) 98 - 107 mmol/L    CO2 8 (LL) 22 - 29 mmol/L    Anion Gap 35 (H) 7 - 16 mmol/L    Glucose 192 (H) 74 - 99 mg/dL    BUN 7 6 - 20 mg/dL    CREATININE 0.5 0.5 - 1.0 mg/dL    GFR Non-African American >60 >=60 mL/min/1.73    GFR African American >60     Calcium 9.0 8.6 - 10.2 mg/dL    Total Protein 7.4 6.4 - 8.3 g/dL    Albumin 4.4 3.5 - 5.2 g/dL    Total Bilirubin 2.0 (H) 0.0 - 1.2 mg/dL    Alkaline Phosphatase 169 (H) 35 - 104 U/L    ALT 48 (H) 0 - 32 U/L    AST 79 (H) 0 - 31 U/L   Troponin   Result Value Ref Range    Troponin, High Sensitivity <6 0 - 9 ng/L   Urinalysis, reflex to microscopic   Result

## 2021-06-30 NOTE — ED NOTES
Critical CO2 7.8 reported to LAWRENCE Lincoln and Dr. Socorro Hernandez.      Yuki Vyas RN  06/29/21 [id number]

## 2021-06-30 NOTE — CARE COORDINATION
SOCIAL WORK / DISCHARGE PLANNING:  COVID testing not done. Sw consult noted \"consider rehab\" Sw attempted to see pt at bedside, sleeping and unable to arouse even calling name loudly. Pt with CIWA scale, from home, in rounds was reported to be asking for possible cane or walker. Will follow up in am, to see if pt wishes to speak with [facility] or proceed with any rehab info or referrals.                  Electronically signed by NORMA Bosch on 6/30/2021 at 4:46 PM

## 2021-06-30 NOTE — ED NOTES
Nurse to nurse report given to [facility] in the [address]. Jaspreet Harvey 98, RN  06/30/21 [id number]

## 2021-06-30 NOTE — H&P
[facility] History and Physical      CHIEF COMPLAINT: Fatigue    History of Present Illness: Patient is a 57-year-old female with past medical history significant for alcohol abuse, tobacco abuse, fibromyalgia, arthritis. She presented to the emergency department with complaints of fatigue that has been present since she stopped drinking alcohol abruptly 4 days ago. She reports that she was drinking approximately 1 bottle of vodka a day for past couple of years prior to stopping. She stated that she had just reached a point where she was tired of drinking alcohol, which prompted her to stop. She denies any history of alcohol withdrawal or seizures. She also reporting some nausea and vomiting for the past few days. She reports she does not believe she has been taking in adequate food or p.o. fluids. She states that she feels a little bit shaky but does not have a tremor. Denies hallucinations. In the ED, her work-up was significant for normal BUN and creatinine, normal chest x-ray, ultrasound of the gallbladder and right upper quadrant showing fatty liver disease, magnesium of 1.3, CO2 of 8, chloride of 92. Medicine was asked to admit for fluid hydration and alcohol withdrawal protocol. REVIEW OF SYSTEMS:  A comprehensive review of systems was negative except for: what is in the HPI    PMH:  Past Medical History:   Diagnosis Date    Arthritis     Atypical chest pain 4/7/2011    Fibromyalgia     Lateral epicondylitis  of elbow 4/7/2011    Tendinitis of elbow 4/7/2011    Tobacco abuse 4/7/2011    Vitamin D deficiency 4/7/2011       Surgical History:  Past Surgical History:   Procedure Laterality Date    APPENDECTOMY      TUBAL LIGATION Bilateral     age 26       Medications Prior to Admission:    Prior to Admission medications    Medication Sig Start Date End Date Taking?  Authorizing Provider   hydroxychloroquine (PLAQUENIL) 200 MG tablet  3/4/21   Historical Provider, MD folic acid (FOLVITE) 1 MG tablet  3/4/21   Historical Provider, MD   baclofen (LIORESAL) 10 MG tablet Take one tab po bid prn for spasms 4/6/21   Emilybehzad Meeks DO   valACYclovir (VALTREX) 500 MG tablet Take one tab po daily 3/3/21   Emilyhan Meeks, DO   Cholecalciferol (VITAMIN D3) 50 MCG (2000 UT) TABS TK 1 T PO QD 3/3/21   Emilybehzad Meeks DO   albuterol sulfate HFA (VENTOLIN HFA) 108 (90 Base) MCG/ACT inhaler Inhale 2 puffs into the lungs 4 times daily as needed for Wheezing 3/3/21   Emily Maribeth Meeks DO   gabapentin (NEURONTIN) 100 MG capsule TK 2 CS PO TID 6/18/19   Historical Provider, MD       Allergies:    Penicillins and Simvastatin    Social History:    reports that she has been smoking cigarettes. She has a 15.00 pack-year smoking history. She has never used smokeless tobacco. She reports current alcohol use. She reports that she does not use drugs. Family History:   family history includes Other (age of onset: 48) in her mother.        PHYSICAL EXAM:  Vitals:  BP (!) 123/58   Pulse 89   Temp 98.5 °F (36.9 °C)   Resp 20   LMP 03/01/2001   SpO2 100%     General Appearance: alert and oriented to person, place and time and in no acute distress  Skin: warm and dry  Head: normocephalic and atraumatic  Eyes: pupils equal, round, and reactive to light, extraocular eye movements intact, conjunctivae normal  Neck: neck supple and non tender without mass   Pulmonary/Chest: clear to auscultation bilaterally- no wheezes, rales or rhonchi, normal air movement, no respiratory distress  Cardiovascular: normal rate, normal S1 and S2 and no carotid bruits  Abdomen: soft, non-tender, non-distended, normal bowel sounds, no masses or organomegaly  Extremities: no cyanosis, no clubbing and no edema  Neurologic: no cranial nerve deficit and speech normal        LABS:  Recent Labs     06/29/21  2123      K 5.0   CL 92*   CO2 8*   BUN 7   CREATININE 0.5   GLUCOSE 192*   CALCIUM 9.0       Recent Labs 06/29/21 2123   WBC 2.6*   RBC 3.13*   HGB 11.3*   HCT 34.4   .9*   MCH 36.1*   MCHC 32.8   RDW 12.6      MPV 10.4       No results for input(s): POCGLU in the last 72 hours. CBC:   Lab Results   Component Value Date    WBC 2.6 06/29/2021    RBC 3.13 06/29/2021    HGB 11.3 06/29/2021    HCT 34.4 06/29/2021    .9 06/29/2021    MCH 36.1 06/29/2021    MCHC 32.8 06/29/2021    RDW 12.6 06/29/2021     06/29/2021    MPV 10.4 06/29/2021     CMP:    Lab Results   Component Value Date     06/29/2021    K 5.0 06/29/2021    CL 92 06/29/2021    CO2 8 06/29/2021    BUN 7 06/29/2021    CREATININE 0.5 06/29/2021    GFRAA >60 06/29/2021    LABGLOM >60 06/29/2021    GLUCOSE 192 06/29/2021    PROT 7.4 06/29/2021    LABALBU 4.4 06/29/2021    CALCIUM 9.0 06/29/2021    BILITOT 2.0 06/29/2021    ALKPHOS 169 06/29/2021    AST 79 06/29/2021    ALT 48 06/29/2021       Radiology:   US GALLBLADDER RUQ   Final Result   Enlarged fatty liver. No sonographic evidence of acute cholecystitis. XR CHEST 1 VIEW   Final Result   No acute pulmonary process             EKG: Sinus tachycardia    ASSESSMENT:      Active Problems:    Dehydration  Resolved Problems:    * No resolved hospital problems. *      PLAN:    Alcohol abuse  Alcohol withdrawal  55-year-old female with history of drinking 1 pint of vodka per day for the past couple of years, presenting after abruptly stopping alcohol intake 4 days ago. She feels somewhat shaky. She denies history of withdrawal symptoms or seizures. She also reports nausea, vomiting.   Medicine was asked to admit for alcohol withdrawal and fluid hydration.  -Place in medical observation  -Continue IV fluids  -[facility] protocol  -Ativan as needed  -Thiamine, folic acid, multivitamin daily    Dehydration  Metabolic acidosis  Likely from alcohol abuse, inadequate fluid intake, vomiting  -continue IVF  -repeat BMP in AM    Fibromyalgia  -continue home dose of Plaquenil, gabapentin    COPD  -nebs as needed    Code Status: Full  DVT prophylaxis: Lovenox, SCDs      NOTE: This report was transcribed using voice recognition software. Every effort was made to ensure accuracy; however, inadvertent computerized transcription errors may be present.   Electronically signed by Kev Smith DO on 6/30/2021 at 12:12 AM

## 2021-06-30 NOTE — PROGRESS NOTES
Patient was admitted post midnight. No fever or chills. She is asking for food. She denied nausea and vomiting  Labs was reviewed. Noted acidosis (GAP)  Continue IV fluid  Continue withdrawal protocol  Repeat BMP at noon  Give 1 time dose of bicarb.     Rest of management as noted in today's H and P.

## 2021-07-01 PROBLEM — E87.20 METABOLIC ACIDOSIS: Status: ACTIVE | Noted: 2021-07-01

## 2021-07-01 LAB
ANION GAP SERPL CALCULATED.3IONS-SCNC: 15 MMOL/L (ref 7–16)
BUN BLDV-MCNC: 6 MG/DL (ref 6–20)
CALCIUM SERPL-MCNC: 8.4 MG/DL (ref 8.6–10.2)
CHLORIDE BLD-SCNC: 100 MMOL/L (ref 98–107)
CO2: 22 MMOL/L (ref 22–29)
CREAT SERPL-MCNC: 0.3 MG/DL (ref 0.5–1)
EKG ATRIAL RATE: 100 BPM
EKG P AXIS: 75 DEGREES
EKG P-R INTERVAL: 156 MS
EKG Q-T INTERVAL: 362 MS
EKG QRS DURATION: 72 MS
EKG QTC CALCULATION (BAZETT): 466 MS
EKG R AXIS: 68 DEGREES
EKG T AXIS: 68 DEGREES
EKG VENTRICULAR RATE: 100 BPM
FOLATE: >20 NG/ML (ref 4.8–24.2)
GFR AFRICAN AMERICAN: >60
GFR NON-AFRICAN AMERICAN: >60 ML/MIN/1.73
GLUCOSE BLD-MCNC: 111 MG/DL (ref 74–99)
HCT VFR BLD CALC: 24.9 % (ref 34–48)
HEMOGLOBIN: 8.7 G/DL (ref 11.5–15.5)
MAGNESIUM: 1.9 MG/DL (ref 1.6–2.6)
MCH RBC QN AUTO: 36.4 PG (ref 26–35)
MCHC RBC AUTO-ENTMCNC: 34.9 % (ref 32–34.5)
MCV RBC AUTO: 104.2 FL (ref 80–99.9)
PDW BLD-RTO: 11.9 FL (ref 11.5–15)
PHOSPHORUS: 1.1 MG/DL (ref 2.5–4.5)
PLATELET # BLD: 105 E9/L (ref 130–450)
PMV BLD AUTO: 10.8 FL (ref 7–12)
POTASSIUM REFLEX MAGNESIUM: 3.8 MMOL/L (ref 3.5–5)
RBC # BLD: 2.39 E12/L (ref 3.5–5.5)
SODIUM BLD-SCNC: 137 MMOL/L (ref 132–146)
TSH SERPL DL<=0.05 MIU/L-ACNC: 3.94 UIU/ML (ref 0.27–4.2)
VITAMIN B-12: 702 PG/ML (ref 211–946)
VITAMIN D 25-HYDROXY: 33 NG/ML (ref 30–100)
WBC # BLD: 2.8 E9/L (ref 4.5–11.5)

## 2021-07-01 PROCEDURE — 36415 COLL VENOUS BLD VENIPUNCTURE: CPT

## 2021-07-01 PROCEDURE — 96368 THER/DIAG CONCURRENT INF: CPT

## 2021-07-01 PROCEDURE — 96372 THER/PROPH/DIAG INJ SC/IM: CPT

## 2021-07-01 PROCEDURE — 99232 SBSQ HOSP IP/OBS MODERATE 35: CPT | Performed by: INTERNAL MEDICINE

## 2021-07-01 PROCEDURE — 96366 THER/PROPH/DIAG IV INF ADDON: CPT

## 2021-07-01 PROCEDURE — G0378 HOSPITAL OBSERVATION PER HR: HCPCS

## 2021-07-01 PROCEDURE — 82306 VITAMIN D 25 HYDROXY: CPT

## 2021-07-01 PROCEDURE — 85027 COMPLETE CBC AUTOMATED: CPT

## 2021-07-01 PROCEDURE — 82746 ASSAY OF FOLIC ACID SERUM: CPT

## 2021-07-01 PROCEDURE — 84100 ASSAY OF PHOSPHORUS: CPT

## 2021-07-01 PROCEDURE — 84443 ASSAY THYROID STIM HORMONE: CPT

## 2021-07-01 PROCEDURE — 97161 PT EVAL LOW COMPLEX 20 MIN: CPT | Performed by: PHYSICAL THERAPIST

## 2021-07-01 PROCEDURE — 6370000000 HC RX 637 (ALT 250 FOR IP): Performed by: INTERNAL MEDICINE

## 2021-07-01 PROCEDURE — 80048 BASIC METABOLIC PNL TOTAL CA: CPT

## 2021-07-01 PROCEDURE — 97530 THERAPEUTIC ACTIVITIES: CPT | Performed by: PHYSICAL THERAPIST

## 2021-07-01 PROCEDURE — 6360000002 HC RX W HCPCS: Performed by: INTERNAL MEDICINE

## 2021-07-01 PROCEDURE — 2580000003 HC RX 258: Performed by: INTERNAL MEDICINE

## 2021-07-01 PROCEDURE — 83735 ASSAY OF MAGNESIUM: CPT

## 2021-07-01 PROCEDURE — 82607 VITAMIN B-12: CPT

## 2021-07-01 PROCEDURE — 1200000000 HC SEMI PRIVATE

## 2021-07-01 RX ORDER — POTASSIUM CHLORIDE 7.45 MG/ML
10 INJECTION INTRAVENOUS
Status: COMPLETED | OUTPATIENT
Start: 2021-07-01 | End: 2021-07-01

## 2021-07-01 RX ADMIN — POTASSIUM CHLORIDE 10 MEQ: 10 INJECTION, SOLUTION INTRAVENOUS at 00:19

## 2021-07-01 RX ADMIN — GABAPENTIN 200 MG: 100 CAPSULE ORAL at 20:35

## 2021-07-01 RX ADMIN — POTASSIUM CHLORIDE 10 MEQ: 10 INJECTION, SOLUTION INTRAVENOUS at 01:17

## 2021-07-01 RX ADMIN — POTASSIUM CHLORIDE 10 MEQ: 10 INJECTION, SOLUTION INTRAVENOUS at 03:54

## 2021-07-01 RX ADMIN — Medication 10 ML: at 20:36

## 2021-07-01 RX ADMIN — FOLIC ACID 1 MG: 1 TABLET ORAL at 08:49

## 2021-07-01 RX ADMIN — ENOXAPARIN SODIUM 40 MG: 40 INJECTION SUBCUTANEOUS at 08:50

## 2021-07-01 RX ADMIN — ACETAMINOPHEN 650 MG: 650 SUPPOSITORY RECTAL at 05:23

## 2021-07-01 RX ADMIN — POTASSIUM CHLORIDE 10 MEQ: 7.46 INJECTION, SOLUTION INTRAVENOUS at 02:33

## 2021-07-01 RX ADMIN — MAGNESIUM SULFATE 1000 MG: 1 INJECTION INTRAVENOUS at 00:04

## 2021-07-01 RX ADMIN — ACETAMINOPHEN 650 MG: 325 TABLET ORAL at 20:35

## 2021-07-01 RX ADMIN — GABAPENTIN 200 MG: 100 CAPSULE ORAL at 08:49

## 2021-07-01 RX ADMIN — THIAMINE HCL TAB 100 MG 100 MG: 100 TAB at 08:49

## 2021-07-01 RX ADMIN — HYDROXYCHLOROQUINE SULFATE 200 MG: 200 TABLET ORAL at 08:49

## 2021-07-01 RX ADMIN — POTASSIUM CHLORIDE 10 MEQ: 7.46 INJECTION, SOLUTION INTRAVENOUS at 03:33

## 2021-07-01 RX ADMIN — Medication 1 TABLET: at 08:49

## 2021-07-01 RX ADMIN — GABAPENTIN 200 MG: 100 CAPSULE ORAL at 14:06

## 2021-07-01 ASSESSMENT — PAIN SCALES - GENERAL
PAINLEVEL_OUTOF10: 0
PAINLEVEL_OUTOF10: 0
PAINLEVEL_OUTOF10: 3
PAINLEVEL_OUTOF10: 4
PAINLEVEL_OUTOF10: 0
PAINLEVEL_OUTOF10: 7
PAINLEVEL_OUTOF10: 0

## 2021-07-01 ASSESSMENT — PAIN DESCRIPTION - LOCATION
LOCATION: FOOT
LOCATION: GENERALIZED

## 2021-07-01 ASSESSMENT — PAIN DESCRIPTION - FREQUENCY
FREQUENCY: CONTINUOUS
FREQUENCY: CONTINUOUS

## 2021-07-01 ASSESSMENT — PAIN DESCRIPTION - DESCRIPTORS
DESCRIPTORS: DISCOMFORT
DESCRIPTORS: ACHING

## 2021-07-01 ASSESSMENT — PAIN DESCRIPTION - ORIENTATION: ORIENTATION: RIGHT;LEFT

## 2021-07-01 ASSESSMENT — PAIN DESCRIPTION - PROGRESSION: CLINICAL_PROGRESSION: NOT CHANGED

## 2021-07-01 ASSESSMENT — PAIN DESCRIPTION - PAIN TYPE
TYPE: CHRONIC PAIN
TYPE: CHRONIC PAIN

## 2021-07-01 ASSESSMENT — PAIN DESCRIPTION - ONSET: ONSET: ON-GOING

## 2021-07-01 ASSESSMENT — PAIN - FUNCTIONAL ASSESSMENT: PAIN_FUNCTIONAL_ASSESSMENT: ACTIVITIES ARE NOT PREVENTED

## 2021-07-01 NOTE — PROGRESS NOTES
2000 Dr Lacho León called regarding critical am labs with orders given to replace mag. Potassium and phos. With med for leg pain also ordered. 2045  BMP and phos. Level drawn. 2140 Dr Lacho León called results with protocols for mag. And phos. Level to be followed. 2300 pt tolerating med with ice pack to left wrist. Pt sleeping without distress or withdrawal noted.

## 2021-07-01 NOTE — PROGRESS NOTES
Comprehensive Nutrition Assessment    Type and Reason for Visit:  Initial, Positive Nutrition Screen    Nutrition Recommendations/Plan: Continue Current Diet, Start Oral Nutrition Supplement (Ensure Enlive QD & Magic cup BID). Nutrition Assessment:  Pt admitted w/ dehydration 2/2 metabolic acidosis w/ PMHx of alcohol abuse. Pt meets criteria for moderate malnutrtion AEB mild fat/muscle wasting, ongoing poor po intake and -7.9% wt loss x4 months. Will provide ONS and Monitor. Malnutrition Assessment:  Malnutrition Status: Moderate malnutrition    Context:  Chronic Illness     Findings of the 6 clinical characteristics of malnutrition:  Energy Intake:  7 - 75% or less estimated energy requirements for 1 month or longer  Weight Loss:  1 - Mild weight loss (specify amount and time period)     Body Fat Loss:  1 - Mild body fat loss Orbital   Muscle Mass Loss:  1 - Mild muscle mass loss Temples (temporalis)  Fluid Accumulation:  No significant fluid accumulation     Strength:  Not Performed    Estimated Daily Nutrient Needs:  Energy (kcal):  6271-0339 (MSJ x 1.2 SF CBW); Weight Used for Energy Requirements:  Current     Protein (g):  70-80 (1.4-1.6 gm/kg CBW); Weight Used for Protein Requirements:  Current        Fluid (ml/day):  4693-0532; Method Used for Fluid Requirements:  1 ml/kcal      Nutrition Related Findings:  Pt resting but noted Ox4, abd WDL, +1.4L I/Os, no edema noted, Low PHOS. Wounds:  None       Current Nutrition Therapies:    ADULT DIET; Regular    Anthropometric Measures:  · Height: 5' 2\" (157.5 cm)  · Current Body Weight: 109 lb (49.4 kg) (7/1 bed scale)   · Admission Body Weight: 108 lb 12.8 oz (49.4 kg) (6/30 bed scale)    · Usual Body Weight: 118 lb 4.8 oz (53.7 kg) (3/3 bed scale per EMR)     · Ideal Body Weight: 110 lbs; % Ideal Body Weight 99.1 %   · BMI: 19.9  · Adjusted Body Weight: No Adjustment   · BMI Categories: Normal Weight (BMI 18.5-24. 9)       Nutrition Diagnosis: · Moderate malnutrition, In context of chronic illness related to catabolic illness (alcohol abuse) as evidenced by poor intake prior to admission, weight loss, mild muscle loss, mild loss of subcutaneous fat (7.9% p5chycdc)      Nutrition Interventions:   Food and/or Nutrient Delivery:  Continue Current Diet, Start Oral Nutrition Supplement (Ensure Enlive QD & Magic cup BID)  Nutrition Education/Counseling:  Education not indicated   Coordination of Nutrition Care:  Continue to monitor while inpatient    Goals:  Pt is to consume >50% of meals and ONS       Nutrition Monitoring and Evaluation:   Behavioral-Environmental Outcomes:  None Identified   Food/Nutrient Intake Outcomes:  Food and Nutrient Intake, Supplement Intake  Physical Signs/Symptoms Outcomes:  Biochemical Data, Nutrition Focused Physical Findings, Skin, Weight, Chewing or Swallowing, GI Status, Fluid Status or Edema     Discharge Planning:     Too soon to determine     Electronically signed by Guillermina Aguilera on 7/1/21 at 3:44 PM EDT    Contact: [phone]

## 2021-07-01 NOTE — PLAN OF CARE
Problem: Pain:  Goal: Pain level will decrease  Description: Pain level will decrease  7/1/2021 0014 by Cong Hernández RN  Outcome: Met This Shift  6/30/2021 1512 by Janna Lucio RN  Outcome: Met This Shift  Goal: Control of acute pain  Description: Control of acute pain  7/1/2021 0014 by Cong Hernández RN  Outcome: Met This Shift  6/30/2021 1512 by Janna Lucio RN  Outcome: Met This Shift  Goal: Control of chronic pain  Description: Control of chronic pain  7/1/2021 0014 by Cong Hernández RN  Outcome: Met This Shift  6/30/2021 1512 by Janna Lucio RN  Outcome: Met This Shift     Problem: Falls - Risk of:  Goal: Will remain free from falls  Description: Will remain free from falls  7/1/2021 0014 by Cong Hernández RN  Outcome: Met This Shift  6/30/2021 1512 by Janna Lucio RN  Outcome: Met This Shift  Goal: Absence of physical injury  Description: Absence of physical injury  7/1/2021 0014 by Cong Hernández RN  Outcome: Met This Shift  6/30/2021 1512 by Janna Lucio RN  Outcome: Met This Shift

## 2021-07-01 NOTE — PROGRESS NOTES
Physical Therapy Initial Evaluation/Plan of Care    Room #:  1145/4420-41  Patient Name: Teo Arthur  YOB: 1965  MRN: [id number]    Date of Service: 7/1/2021     Tentative placement recommendation: Home Health Physical Therapy   Equipment recommendation: Almon Shock      Evaluating Physical Therapist: Steffi Griffith, PT, DPT     Specific Provider Orders/Date/Referring Provider :   07/01/21 1430   PT evaluation Start: 07/01/21 1430, End: 07/01/21 1430, ONE TIME, Standing Count: 1 Occurrences, R    Ry Connors MD Acknowledge New      Admitting Diagnosis:   Dehydration [Q30.1]  Metabolic acidosis [A93.3]     Visit Diagnoses       Codes    Alcohol abuse     F10.10    Hypomagnesemia     E83.42    Hypochloremia     E87.8        Surgery: None    Patient Active Problem List   Diagnosis    Tendinitis of elbow    Atypical chest pain    Tobacco abuse    Vitamin D deficiency    Lateral epicondylitis    Fibromyalgia    Recurrent cold sores    Dehydration    Metabolic acidosis       ASSESSMENT of Current Deficits Patient exhibits decreased strength, balance, endurance and coordination impairing functional mobility, transfers, gait , gait distance and tolerance to activity        PHYSICAL THERAPY  PLAN OF CARE       Physical therapy plan of care is established based on physician order,  patient diagnosis and clinical assessment    Current Treatment Recommendations:    -Bed Mobility: Lower extremity exercises  and Trunk control activities   -Sitting Balance: Incorporate reaching activities to activate trunk muscles , Hands on support to maintain midline , Facilitate active trunk muscle engagement , Facilitate postural control in all planes  and Engage in core activities to allow for movement within base of support   -Standing Balance: Perform strengthening exercises in standing to promote motor control with or without upper extremity support , Instruct patient on adequate base of support to maintain balance and Challenge balance utilizing reaching  activities beyond center of gravity    -Transfers: Provide instruction on proper hand and foot position for adequate transfer of weight onto lower extremities and use of gait device, Cues for hand placement, technique and safety, Facilitate weight shift forward on to lower extremities and provide necessary stabilization of bilateral lower extremities , Support transfer of weight on to lower extremities, Assist with extension of knees trunk and hip to accept weight transfer  and Provide stabilization to prevent fall   -Gait: Gait training, Standing activities to improve: base of support, weight shift, weight bearing , Exercises to improve trunk control, Exercises to improve hip and knee control, Performance of protected weight bearing activities and Activities to increase weight bearing   -Endurance: Utilize Supervised activities to increase level of endurance to allow for safe functional mobility including transfers and gait  and Use graduated activities to promote good breathing techniques and provide support and education to maximize respiratory function    PT long term treatment goals are located in below grid    Patient and or family understand(s) diagnosis, prognosis, and plan of care. Frequency of treatments: Patient will be seen  daily. Prior Level of Function: Patient ambulated independently   Rehab Potential: fair  + for baseline    Past medical history:   Past Medical History:   Diagnosis Date    Arthritis     Atypical chest pain 4/7/2011    Fibromyalgia     Lateral epicondylitis  of elbow 4/7/2011    Tendinitis of elbow 4/7/2011    Tobacco abuse 4/7/2011    Vitamin D deficiency 4/7/2011     Past Surgical History:   Procedure Laterality Date    APPENDECTOMY      TUBAL LIGATION Bilateral     age 26       SUBJECTIVE:    Precautions:  Up with assistance, falls and alarm ,  Social history: Patient lives alone  in a two story home bedroom and bathroom 2nd floor with 14 steps   with 3 steps  to enter with Rail  Tub shower grab bars. Pt stated that she expects to be going back to stay with her sister after hospital stay and she has a ranch and no steps. Equipment owned: None,      [address]   17/24    AM-[facility] Mobility Inpatient   How much difficulty turning over in bed?: A Little  How much difficulty sitting down on / standing up from a chair with arms?: A Little  How much difficulty moving from lying on back to sitting on side of bed?: A Little  How much help from another person moving to and from a bed to a chair?: A Little  How much help from another person needed to walk in hospital room?: A Little  How much help from another person for climbing 3-5 steps with a railing?: A Lot  AM-PAC Inpatient Mobility Raw Score : 17  AM-PAC Inpatient T-Scale Score : 42.13  Mobility Inpatient CMS 0-100% Score: 50.57  Mobility Inpatient CMS G-Code Modifier : CK    Nursing cleared patient for PT evaluation. The admitting diagnosis and active problem list as listed above have been reviewed prior to the initiation of this evaluation. OBJECTIVE;   Initial Evaluation  Date: 7/1/2021 Treatment Date:     Short Term/ Long Term   Goals   Was pt agreeable to Eval/treatment? Yes   To be met in 3 days   Pain level   6/10  B feet/ankles     Bed Mobility    Rolling: Supervision   Supine to sit: Supervision    Sit to supine: Supervision    Scooting: Supervision    Rolling: Independent   Supine to sit: Independent   Sit to supine: Independent   Scooting: Independent    Transfers Sit to stand:  Moderate assist of 1   Sit to stand: Independent    Ambulation    25 feet using  wheeled walker with Minimal assist of 1      > 150 feet using  wheeled walker with Independent    Stair negotiation: ascended and descended   Not assessed        ROM Within functional limits    Increase range of motion 10% of affected joints    Strength BUE:  refer to OT eval  RLE: 3+/5  LLE:  3+/5  Increase strength in affected mm groups by 1/3 grade   Balance Sitting EOB:  fair   Dynamic Standing:  fair -  Sitting EOB:  fair +  Dynamic Standing: fair      Patient is Alert & Oriented x person, place, time and situation and follows directions   Sensation:  Patient  denies numbness/tingling  Edema:  no   Endurance: fair  -    Vitals: room air   Blood Pressure at rest  Blood Pressure during session    Heart Rate at rest  Heart Rate during session    SPO2 at rest %  SPO2 during session %     Patient education  Patient educated on role of Physical Therapy, risks of immobility, safety and plan of care, energy conservation,  importance of mobility while in hospital  and safety      Patient response to education:   Pt verbalized understanding Pt demonstrated skill Pt requires further education in this area   Yes Partial Yes      Treatment:  Patient practiced and was instructed/facilitated in the following treatment: Patient  Sat edge of bed 10 minutes with Supervision  to increase dynamic sitting balance and activity tolerance. Therapeutic Exercises:  not performed      At end of session, patient in bed with alarm call light and phone within reach, all lines and tubes intact, nursing notified. Patient would benefit from continued skilled Physical Therapy to improve functional independence and quality of life. Patient's/ family goals   Go to [facility] and have [facility] PT      Time in  46  Time out  1623    Total Treatment Time  10 minutes    Evaluation time includes thorough review of current medical information, gathering information on past medical history/social history and prior level of function, completion of standardized testing/informal observation of tasks, assessment of data, and development of Plan of care and goals.      CPT codes:  Low Complexity PT evaluation (09197)  Therapeutic activities (44679)   10 minutes  1 unit(s)    Karon Pulido PT

## 2021-07-01 NOTE — PLAN OF CARE
Problem: Pain:  Goal: Pain level will decrease  Description: Pain level will decrease  Outcome: Met This Shift  Goal: Control of acute pain  Description: Control of acute pain  Outcome: Met This Shift  Goal: Control of chronic pain  Description: Control of chronic pain  Outcome: Met This Shift     Problem: Falls - Risk of:  Goal: Will remain free from falls  Description: Will remain free from falls  Outcome: Met This Shift  Goal: Absence of physical injury  Description: Absence of physical injury  Outcome: Met This Shift     Problem: Malnutrition  (NI-5.2)  Goal: Food and/or Nutrient Delivery  Description: Individualized approach for food/nutrient provision.   7/1/2021 1543 by Brenda Dozier  Outcome: Met This Shift

## 2021-07-01 NOTE — CARE COORDINATION
SOCIAL WORK / DISCHARGE PLANNING:  COVID testing not done. Sw met with pt at bedside, difficulty getting pt awake to complete assessment. Pt is focused on physical rehab and getting a walker or cane. She states she is going to stay at her sister, Sujey's home in [facility] [place] [facility] but could not provide address. Pt lived alone in 2 story home, bed and bath upstairs, no dme. Denies hx of HHC/EVERT. [facility] [place], await therapy evals to assist with dc planning. [facility]. Sw attempted to discuss ETOH use and she was slightly agitated with discussion. She is open to speak to [facility] but made it clear she is not interested in inpatient treatment center. PCP Dr Tim Cabral. Referral called to [facility], [facility]. Addendum: 420pm - Sw attempted to review IMM letter with pt, again sleeping and unable to arouse. Will have CM assistant follow up next date. Will need sister's name address and phone number to confirm plan to dc home with her as well [facility].              Electronically signed by NORMA Montero on 7/1/2021 at 3:18 PM

## 2021-07-01 NOTE — PROGRESS NOTES
Department of Internal Medicine  General Internal Medicine  Attending Progress Note  Chief Complaint   Patient presents with    Fatigue     withdrawls- hasnt drank since saturday     SUBJECTIVE:    Reports that she is feeling better today. No fever or chills. No chest pain. Noted good appetite. Stated that she will prefer to be discharged to her home than going to rehab.      OBJECTIVE      Medications    Current Facility-Administered Medications: sodium chloride flush 0.9 % injection 5-40 mL, 5-40 mL, Intravenous, 2 times per day  sodium chloride flush 0.9 % injection 5-40 mL, 5-40 mL, Intravenous, PRN  0.9 % sodium chloride infusion, 25 mL, Intravenous, PRN  LORazepam (ATIVAN) tablet 1 mg, 1 mg, Oral, Q1H PRN **OR** LORazepam (ATIVAN) injection 1 mg, 1 mg, Intravenous, Q1H PRN **OR** LORazepam (ATIVAN) tablet 2 mg, 2 mg, Oral, Q1H PRN **OR** LORazepam (ATIVAN) injection 2 mg, 2 mg, Intravenous, Q1H PRN **OR** LORazepam (ATIVAN) tablet 3 mg, 3 mg, Oral, Q1H PRN **OR** LORazepam (ATIVAN) injection 3 mg, 3 mg, Intravenous, Q1H PRN **OR** LORazepam (ATIVAN) tablet 4 mg, 4 mg, Oral, Q1H PRN **OR** LORazepam (ATIVAN) injection 4 mg, 4 mg, Intravenous, Q1H PRN  enoxaparin (LOVENOX) injection 40 mg, 40 mg, Subcutaneous, Daily  ondansetron (ZOFRAN-ODT) disintegrating tablet 4 mg, 4 mg, Oral, Q8H PRN **OR** ondansetron (ZOFRAN) injection 4 mg, 4 mg, Intravenous, Q6H PRN  polyethylene glycol (GLYCOLAX) packet 17 g, 17 g, Oral, Daily PRN  acetaminophen (TYLENOL) tablet 650 mg, 650 mg, Oral, Q6H PRN **OR** acetaminophen (TYLENOL) suppository 650 mg, 650 mg, Rectal, Q6H PRN  thiamine mononitrate tablet 100 mg, 100 mg, Oral, Daily  therapeutic multivitamin-minerals 1 tablet, 1 tablet, Oral, Daily  folic acid (FOLVITE) tablet 1 mg, 1 mg, Oral, Daily  hydroxychloroquine (PLAQUENIL) tablet 200 mg, 200 mg, Oral, Daily with breakfast  gabapentin (NEURONTIN) capsule 200 mg, 200 mg, Oral, TID  oxyCODONE (ROXICODONE) immediate release tablet 5 mg, 5 mg, Oral, Q4H PRN  sodium phosphate 8 mmol in dextrose 5 % 250 mL IVPB, 8 mmol, Intravenous, PRN **OR** sodium phosphate 16 mmol in dextrose 5 % 250 mL IVPB, 16 mmol, Intravenous, PRN  potassium chloride (KLOR-CON M) extended release tablet 40 mEq, 40 mEq, Oral, PRN **OR** potassium bicarb-citric acid (EFFER-K) effervescent tablet 40 mEq, 40 mEq, Oral, PRN **OR** potassium chloride 10 mEq/100 mL IVPB (Peripheral Line), 10 mEq, Intravenous, PRN  magnesium sulfate 1000 mg in dextrose 5% 100 mL IVPB, 1,000 mg, Intravenous, PRN  Physical    VITALS:  BP (!) 106/52   Pulse 99   Temp 99 °F (37.2 °C) (Oral)   Resp 16   Wt 108 lb 12.8 oz (49.4 kg)   LMP 03/01/2001   SpO2 96%   BMI 19.90 kg/m²   CONSTITUTIONAL:  awake, alert, cooperative, no apparent distress, and appears stated age  EYES:  Lids and lashes normal, pupils equal, round and reactive to light, extra ocular muscles intact, sclera clear, conjunctiva normal  ENT:  Normocephalic, without obvious abnormality, atraumatic, sinuses nontender on palpation, external ears without lesions, oral pharynx with moist mucus membranes, tonsils without erythema or exudates, gums normal and good dentition.   NECK:  Supple, symmetrical, trachea midline, no adenopathy, thyroid symmetric, not enlarged and no tenderness, skin normal  BACK:  Symmetric, no curvature, spinous processes are non-tender on palpation, paraspinous muscles are non-tender on palpation, no costal vertebral tenderness  LUNGS:  No increased work of breathing, good air exchange, clear to auscultation bilaterally, no crackles or wheezing  CARDIOVASCULAR:  Normal apical impulse, regular rate and rhythm, normal S1 and S2, no S3 or S4, and no murmur noted  ABDOMEN:  No scars, normal bowel sounds, soft, non-distended, non-tender, no masses palpated, no hepatosplenomegally  MUSCULOSKELETAL:  there is no redness, warmth, or swelling of the joints  NEUROLOGIC:  No focal neuro deficit  SKIN:  no bruising or bleeding  Data    CBC:   Lab Results   Component Value Date    WBC 2.8 07/01/2021    RBC 2.39 07/01/2021    HGB 8.7 07/01/2021    HCT 24.9 07/01/2021    .2 07/01/2021    MCH 36.4 07/01/2021    MCHC 34.9 07/01/2021    RDW 11.9 07/01/2021     07/01/2021    MPV 10.8 07/01/2021     BMP:    Lab Results   Component Value Date     07/01/2021    K 3.8 07/01/2021     07/01/2021    CO2 22 07/01/2021    BUN 6 07/01/2021    LABALBU 4.4 06/29/2021    CREATININE 0.3 07/01/2021    CALCIUM 8.4 07/01/2021    GFRAA >60 07/01/2021    LABGLOM >60 07/01/2021    GLUCOSE 111 07/01/2021       ASSESSMENT AND PLAN      1.  Dehydration    2. Metabolic acidosis    3. Macrocytic Anemia\": possible etoh abuse related    4. Electrolyte Abnormality    5. Hypophosphatemia:    6. Alcohol Abuse:     Plans:  Stop IV fluid after current bag  PT/OT  Alcohol withdrawal protocol  Check TSH, vit D and Vit B 12 and folate  Plan to discharge in am if still stable.   Can go home with home PT.

## 2021-07-02 VITALS
TEMPERATURE: 98.2 F | SYSTOLIC BLOOD PRESSURE: 109 MMHG | HEART RATE: 95 BPM | WEIGHT: 109 LBS | RESPIRATION RATE: 17 BRPM | DIASTOLIC BLOOD PRESSURE: 57 MMHG | BODY MASS INDEX: 20.06 KG/M2 | OXYGEN SATURATION: 100 % | HEIGHT: 62 IN

## 2021-07-02 PROCEDURE — 96372 THER/PROPH/DIAG INJ SC/IM: CPT

## 2021-07-02 PROCEDURE — 97110 THERAPEUTIC EXERCISES: CPT

## 2021-07-02 PROCEDURE — G0378 HOSPITAL OBSERVATION PER HR: HCPCS

## 2021-07-02 PROCEDURE — 97165 OT EVAL LOW COMPLEX 30 MIN: CPT | Performed by: OCCUPATIONAL THERAPIST

## 2021-07-02 PROCEDURE — 6360000002 HC RX W HCPCS: Performed by: INTERNAL MEDICINE

## 2021-07-02 PROCEDURE — 6370000000 HC RX 637 (ALT 250 FOR IP): Performed by: INTERNAL MEDICINE

## 2021-07-02 PROCEDURE — 97530 THERAPEUTIC ACTIVITIES: CPT

## 2021-07-02 PROCEDURE — 99239 HOSP IP/OBS DSCHRG MGMT >30: CPT | Performed by: INTERNAL MEDICINE

## 2021-07-02 RX ORDER — THIAMINE MONONITRATE (VIT B1) 100 MG
100 TABLET ORAL DAILY
Qty: 30 TABLET | Refills: 0 | Status: SHIPPED
Start: 2021-07-03 | End: 2021-07-08 | Stop reason: DRUGHIGH

## 2021-07-02 RX ORDER — OXYCODONE HYDROCHLORIDE 5 MG/1
5 TABLET ORAL EVERY 8 HOURS PRN
Qty: 9 TABLET | Refills: 0 | Status: SHIPPED | OUTPATIENT
Start: 2021-07-02 | End: 2021-07-05

## 2021-07-02 RX ADMIN — ENOXAPARIN SODIUM 40 MG: 40 INJECTION SUBCUTANEOUS at 09:30

## 2021-07-02 RX ADMIN — OXYCODONE HYDROCHLORIDE 5 MG: 5 TABLET ORAL at 09:29

## 2021-07-02 RX ADMIN — ACETAMINOPHEN 650 MG: 325 TABLET ORAL at 05:56

## 2021-07-02 RX ADMIN — HYDROXYCHLOROQUINE SULFATE 200 MG: 200 TABLET ORAL at 09:29

## 2021-07-02 RX ADMIN — OXYCODONE HYDROCHLORIDE 5 MG: 5 TABLET ORAL at 00:36

## 2021-07-02 RX ADMIN — GABAPENTIN 200 MG: 100 CAPSULE ORAL at 09:30

## 2021-07-02 RX ADMIN — Medication 1 TABLET: at 09:30

## 2021-07-02 RX ADMIN — FOLIC ACID 1 MG: 1 TABLET ORAL at 09:29

## 2021-07-02 RX ADMIN — THIAMINE HCL TAB 100 MG 100 MG: 100 TAB at 09:30

## 2021-07-02 ASSESSMENT — PAIN SCALES - GENERAL
PAINLEVEL_OUTOF10: 3
PAINLEVEL_OUTOF10: 7
PAINLEVEL_OUTOF10: 4
PAINLEVEL_OUTOF10: 7
PAINLEVEL_OUTOF10: 7
PAINLEVEL_OUTOF10: 0

## 2021-07-02 ASSESSMENT — PAIN DESCRIPTION - DESCRIPTORS
DESCRIPTORS: DISCOMFORT;ACHING

## 2021-07-02 ASSESSMENT — PAIN DESCRIPTION - PAIN TYPE
TYPE: CHRONIC PAIN

## 2021-07-02 ASSESSMENT — PAIN DESCRIPTION - LOCATION
LOCATION: FOOT
LOCATION: FOOT
LOCATION: GENERALIZED

## 2021-07-02 ASSESSMENT — PAIN DESCRIPTION - PROGRESSION
CLINICAL_PROGRESSION: NOT CHANGED

## 2021-07-02 ASSESSMENT — PAIN DESCRIPTION - ONSET
ONSET: ON-GOING

## 2021-07-02 ASSESSMENT — PAIN DESCRIPTION - ORIENTATION
ORIENTATION: RIGHT;LEFT
ORIENTATION: RIGHT;LEFT

## 2021-07-02 ASSESSMENT — PAIN - FUNCTIONAL ASSESSMENT
PAIN_FUNCTIONAL_ASSESSMENT: ACTIVITIES ARE NOT PREVENTED
PAIN_FUNCTIONAL_ASSESSMENT: ACTIVITIES ARE NOT PREVENTED

## 2021-07-02 ASSESSMENT — PAIN DESCRIPTION - FREQUENCY
FREQUENCY: CONTINUOUS

## 2021-07-02 NOTE — PROGRESS NOTES
[address] [facility]  [place] [facility] [place]                                                   Patient Name: Denver Peat     MRN: [id number]     : 1965     Room: [address] [place] [address]       Evaluating OT: Eun Tatum, OTR/L; [id number]       Referring Provider and Orders/Date:  OT eval and treat Start: 21 1430, End: 21 1430, ONE TIME, Standing Count: 1 Occurrences, R    Guerrero Byrd MD     Diagnosis:   1. Dehydration    2. Alcohol abuse    3. Hypomagnesemia    4.  Hypochloremia            Pertinent Medical History:       Past Medical History:   Diagnosis Date    Arthritis     Atypical chest pain 2011    Fibromyalgia     Lateral epicondylitis  of elbow 2011    Tendinitis of elbow 2011    Tobacco abuse 2011    Vitamin D deficiency 2011          Past Surgical History:   Procedure Laterality Date    APPENDECTOMY      TUBAL LIGATION Bilateral     age 26       Precautions:  Fall Risk    Recommended placement: home with home health services    Assessment of current deficits     [x] Functional mobility  [x]ADLs  [x] Strength               []Cognition     [x] Functional transfers   [x] IADLs         [x] Safety Awareness   [x]Endurance     [] Fine Coordination              [x] Balance      [] Vision/perception   []Sensation      [x]Gross Motor Coordination  [] ROM  [] Delirium                   [] Motor Control     OT PLAN OF CARE   OT POC based on physician orders, patient diagnosis and results of clinical assessment    Frequency/Duration 1-3 days/wk for 2 weeks PRN   Specific OT Treatment Interventions to include:   * Instruction/training on adapted ADL techniques and AE recommendations to increase functional independence within precautions       * Training on energy conservation strategies, correct breathing pattern and techniques to improve independence/tolerance for self-care routine  * Functional transfer/mobility training/DME recommendations for increased independence, safety, and fall prevention  * Patient/Family education to increase follow through with safety techniques and functional independence  * Recommendation of environmental modifications for increased safety with functional transfers/mobility and ADLs  * Therapeutic exercise to improve motor endurance, ROM, and functional strength for ADLs/functional transfers  * Therapeutic activities to facilitate/challenge dynamic balance, stand tolerance for increased safety and independence with ADLs  * Therapeutic activities to facilitate gross/fine motor skills for increased independence with ADLs     Recommended Adaptive Equipment/DME: TTB; TBD      Home Living: Pt plans to move in with sister at [place] [facility] who lives in a ranch without steps. Pt lived alone in a 2 story home with 3 steps to enter (rail) and bed and bath upstairs. Will have 24/7 assist   Bathroom setup: Tub shower with grab bars, no DME, standard toilet    DME owned: none     Prior Level of Function: indep with ADLs , indep with IADLs; ambulated indep   Driving: yes   Occupation: [employer]   Enjoys: \"All I did was drink, so this new life will be new\"    Pain Level: none  Cognition: A&O: 4/4; Follows 4 step directions   Memory:  Intact   Sequencing:  Intact   Problem solving:  Intact   Judgement/safety:  Intact     Functional Assessment:  AM-PAC Daily Activity Raw Score: 21/24   Initial Eval Status  Date: 7/2/21 Treatment Status  Date: STGs = LTGs  Time frame: 10-14 days   Feeding Independent   NA-PLOF   Grooming Independent washing hands at sink  Na-plof   UB Dressing Independent   NA-PLOF   LB Dressing Supervision to pull up pants in standing for balance and safety. Socks indep.    Independent    Bathing Supervision for safety, balance and energy conservation in sitting/standing EOB  Independent    Toileting Supervision for

## 2021-07-02 NOTE — CARE COORDINATION
Peer Recovery Support Note    Name: Billi Canavan  Date: 7/2/2021    Chief Complaint   Patient presents with    Fatigue     withdrawls- hasnt drank since saturday       Peer Support met with patient. [x] Support and education provided  [x] Resources provided   [] Treatment referral:   [] Other:   [] Patient declined peer recovery services     Referred By: Lore Form    Notes: Patient not very receptive to interventions of any type at this time.      Signed: Cinthia Pace, [address] [address], 7/2/2021

## 2021-07-02 NOTE — CARE COORDINATION
SS Note: COVID testing not done. AKANKSHA called wheeled walker referral with orders to [address] [employer] also called referral to Yesenia Greco, [facility], pt accepted however agency unable to see pt until Monday. AKANKSHA notified Dr. Sarah Baker and he is aware and to state in his note that Bobby 76 able to wait until Monday to visit pt.   Electronically signed by NORMA Benites on 7/2/2021 at 12:25 PM

## 2021-07-02 NOTE — DISCHARGE SUMMARY
Physician Discharge Summary     Patient ID:  Nando Hickman  [id number]  80 y.o.  1965    Admit date: 6/29/2021    Discharge date and time: No discharge date for patient encounter. Admitting Physician: Leora Figueroa DO     Discharge Physician: Emily Lerma [facility]    Admission Diagnoses: Dehydration [H59.9]  Metabolic acidosis [S40.7]    Discharge Diagnoses:   1. Alcohol withdrawal  2. Alcohol Abuse  3. Metabolic Acidosis due to #1  4. 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   hypokalemia, Hypomagnesemia and hypophosphatemia  6. Ataxia  7. Debility    Admission Condition: poor    Discharged Condition: good    Indication for Admission:     Hospital Course:   Patient was admitted with withdrawal symptoms. She was started on withdrawal protocol. Her electrolytes were corrected and patient's symptoms improved significantly. Patient seems unsteady on her gait, but requested to be discharged home. She stated that she will be staying with her friend until she is stable enough to stay on her own. Patient was encouraged to quit smoking. Time spent in discharge of patient is greater than 30 minutes. Consults: none    Significant Diagnostic Studies: labs:     Treatments: IV hydration    Discharge Exam:  BP (!) 109/57   Pulse 95   Temp 98.2 °F (36.8 °C) (Oral)   Resp 17   Ht 5' 2\" (1.575 m)   Wt 109 lb (49.4 kg)   LMP 03/01/2001   SpO2 100%   BMI 19.94 kg/m²   General appearance: alert, appears stated age and cooperative  Head: Normocephalic, without obvious abnormality, atraumatic  Eyes: conjunctivae/corneas clear. PERRL, EOM's intact. Fundi benign. Ears: normal TM's and external ear canals both ears  Back: symmetric, no curvature. ROM normal. No CVA tenderness.   Lungs: clear to auscultation bilaterally  Heart: regular rate and rhythm, S1, S2 normal, no murmur, click, rub or gallop  Abdomen: soft, non-tender; bowel sounds normal; no masses,  no organomegaly  Extremities: extremities normal, atraumatic, no cyanosis or edema  Pulses: 2+ and symmetric    Disposition: home    In process/preliminary results:  Outstanding Order Results     No orders found from 5/31/2021 to 6/30/2021. Patient Instructions:   Current Discharge Medication List      START taking these medications    Details   thiamine mononitrate (THIAMINE) 100 MG tablet Take 1 tablet by mouth daily  Qty: 30 tablet, Refills: 0         CONTINUE these medications which have NOT CHANGED    Details   hydroxychloroquine (PLAQUENIL) 200 MG tablet       folic acid (FOLVITE) 1 MG tablet       valACYclovir (VALTREX) 500 MG tablet Take one tab po daily  Qty: 30 tablet, Refills: 3      Cholecalciferol (VITAMIN D3) 50 MCG (2000 UT) TABS TK 1 T PO QD  Qty: 30 tablet, Refills: 3      gabapentin (NEURONTIN) 100 MG capsule TK 2 CS PO TID  Refills: 3      baclofen (LIORESAL) 10 MG tablet Take one tab po bid prn for spasms  Qty: 30 tablet, Refills: 0      albuterol sulfate HFA (VENTOLIN HFA) 108 (90 Base) MCG/ACT inhaler Inhale 2 puffs into the lungs 4 times daily as needed for Wheezing  Qty: 1 Inhaler, Refills: 5           Activity: activity as tolerated  Diet: regular diet  Wound Care: none needed    Follow-up with PCP in 2 weeks.     SignedKarena Hickmanungwu  7/2/2021  2:33 PM

## 2021-07-02 NOTE — PLAN OF CARE
Problem: Pain:  Description: Pain management should include both nonpharmacologic and pharmacologic interventions. Goal: Pain level will decrease  7/2/2021 0406 by Glendy Lugo RN  Outcome: Met This Shift     Problem: Pain:  Description: Pain management should include both nonpharmacologic and pharmacologic interventions. Goal: Control of acute pain  7/2/2021 0406 by Glendy Lugo RN  Outcome: Met This Shift     Problem: Pain:  Description: Pain management should include both nonpharmacologic and pharmacologic interventions.   Goal: Control of chronic pain  7/2/2021 0406 by Glendy Lugo RN  Outcome: Met This Shift     Problem: Falls - Risk of:  Goal: Will remain free from falls  7/2/2021 0406 by Glendy Lugo RN  Outcome: Met This Shift     Problem: Falls - Risk of:  Goal: Absence of physical injury  7/2/2021 0406 by Glendy Lugo RN  Outcome: Met This Shift

## 2021-07-02 NOTE — PROGRESS NOTES
support to maintain midline , Facilitate active trunk muscle engagement , Facilitate postural control in all planes  and Engage in core activities to allow for movement within base of support   -Standing Balance: Perform strengthening exercises in standing to promote motor control with or without upper extremity support , Instruct patient on adequate base of support to maintain balance and Challenge balance utilizing reaching  activities beyond center of gravity    -Transfers: Provide instruction on proper hand and foot position for adequate transfer of weight onto lower extremities and use of gait device, Cues for hand placement, technique and safety, Facilitate weight shift forward on to lower extremities and provide necessary stabilization of bilateral lower extremities , Support transfer of weight on to lower extremities, Assist with extension of knees trunk and hip to accept weight transfer  and Provide stabilization to prevent fall   -Gait: Gait training, Standing activities to improve: base of support, weight shift, weight bearing , Exercises to improve trunk control, Exercises to improve hip and knee control, Performance of protected weight bearing activities and Activities to increase weight bearing   -Endurance: Utilize Supervised activities to increase level of endurance to allow for safe functional mobility including transfers and gait  and Use graduated activities to promote good breathing techniques and provide support and education to maximize respiratory function    PT long term treatment goals are located in below grid    Patient and or family understand(s) diagnosis, prognosis, and plan of care. Frequency of treatments: Patient will be seen  daily.          Prior Level of Function: Patient ambulated independently   Rehab Potential: fair  + for baseline    Past medical history:   Past Medical History:   Diagnosis Date    Arthritis     Atypical chest pain 4/7/2011    Fibromyalgia     Lateral epicondylitis  of elbow 4/7/2011    Tendinitis of elbow 4/7/2011    Tobacco abuse 4/7/2011    Vitamin D deficiency 4/7/2011     Past Surgical History:   Procedure Laterality Date    APPENDECTOMY      TUBAL LIGATION Bilateral     age 26       SUBJECTIVE:    Precautions: Up with assistance, falls and alarm ,  Social history: Patient lives alone  in a two story home bedroom and bathroom 2nd floor with 14 steps   with 3 steps  to enter with Rail  Tub shower grab bars. Pt stated that she expects to be going back to stay with her sister after hospital stay and she has a ranch and no steps. Equipment owned: None,      Via [employer]   17/24    AM-[facility] Mobility Inpatient   How much difficulty turning over in bed?: None  How much difficulty sitting down on / standing up from a chair with arms?: A Little  How much difficulty moving from lying on back to sitting on side of bed?: A Little  How much help from another person moving to and from a bed to a chair?: A Little  How much help from another person needed to walk in hospital room?: A Little  How much help from another person for climbing 3-5 steps with a railing?: A Little  AM-[facility] Inpatient Mobility Raw Score : 19  AM-PAC Inpatient T-Scale Score : 45.44  Mobility Inpatient CMS 0-100% Score: 41.77  Mobility Inpatient CMS G-Code Modifier : CK    Nursing cleared patient for PT treatment. OBJECTIVE;   Initial Evaluation  Date: 7/1/2021 Treatment Date:  7/2/2021     Short Term/ Long Term   Goals   Was pt agreeable to Eval/treatment? Yes  Yes  To be met in 3 days   Pain level   6/10  B feet/ankles 7/10    Bed Mobility    Rolling: Supervision   Supine to sit: Supervision    Sit to supine: Supervision    Scooting: Supervision   Rolling: Independent   Supine to sit: Supervision    Sit to supine: Supervision    Scooting: Independent    Rolling: Independent   Supine to sit:  Independent   Sit to supine: Independent   Scooting: Independent    Transfers Sit to stand: Moderate assist of 1  Sit to stand: Minimal assist of 1      Sit to stand: Independent    Ambulation    25 feet using  wheeled walker with Minimal assist of 1    35 feet using  wheeled walker with Minimal assist of 1      > 150 feet using  wheeled walker with Independent    Stair negotiation: ascended and descended   Not assessed        ROM Within functional limits    Increase range of motion 10% of affected joints    Strength BUE:  refer to OT eval  RLE:  3+/5  LLE:  3+/5    Increase strength in affected mm groups by 1/3 grade   Balance Sitting EOB:  fair   Dynamic Standing:  fair - Sitting EOB: fair   Dynamic Standing: fair-    Sitting EOB:  fair +  Dynamic Standing: fair      Patient is Alert & Oriented x person, place, time and situation and follows directions   Sensation:  Patient  denies numbness/tingling  Edema:  no   Endurance: fair  -    Vitals: room air   Blood Pressure at rest  Blood Pressure during session    Heart Rate at rest  Heart Rate during session 107   SPO2 at rest %  SPO2 during session  %     Patient education  Patient educated on role of Physical Therapy, risks of immobility, safety and plan of care, energy conservation,  importance of mobility while in hospital  and safety      Patient response to education:   Pt verbalized understanding Pt demonstrated skill Pt requires further education in this area   Yes Partial Yes      Treatment:  Patient practiced and was instructed/facilitated in the following treatment: Patient transferred to edge of bed and  Sat edge of bed 10 minutes with Supervision  to increase dynamic sitting balance and activity tolerance. stood and ambulated in hallway, patient exerted and needing a seated rest break. Performed seated exercise.        Therapeutic Exercises:  ankle pumps, heel raises, hip abduction/adduction, long arc quad and seated marching x 15 reps      At end of session, patient in bed with alarm call light and phone within reach, all lines and tubes intact, nursing notified. Patient would benefit from continued skilled Physical Therapy to improve functional independence and quality of life.          Patient's/ family goals   [facility]      Time in  0  Time out  907    Total Treatment Time  25 minutes        CPT codes:    Therapeutic activities (79003)   13 minutes  1 unit(s)  Therapeutic exercises (83554)   12 minutes  1 unit(s)    Dannie Becerra, PTA   QQW#[id number]

## 2021-07-06 ENCOUNTER — TELEPHONE (OUTPATIENT)
Dept: FAMILY MEDICINE CLINIC | Age: 56
End: 2021-07-06

## 2021-07-06 NOTE — TELEPHONE ENCOUNTER
[facility] Transitions Initial Follow Up Call    Outreach made within 2 business days of discharge: Yes    Patient: Sue Jackson Patient : 1965   MRN: <[id number]>  Reason for Admission: There are no discharge diagnoses documented for the most recent discharge. Discharge Date: 21       Spoke with: Alejandro Hernandez    Discharge department/facility: [place]     TCM Interactive Patient Contact:  Was patient able to fill all prescriptions: Yes  Was patient instructed to bring all medications to the follow-up visit: Yes  Is patient taking all medications as directed in the discharge summary?  Yes  Does patient understand their discharge instructions: Yes  Does patient have questions or concerns that need addressed prior to 7-14 day follow up office visit: no    Scheduled appointment with PCP within 7-14 days    Follow Up  Future Appointments   Date Time Provider Adam Root   2021 10:30 AM DO Donnell Kerns [facility]       Nicky Gatica

## 2021-07-07 ENCOUNTER — TELEPHONE (OUTPATIENT)
Dept: FAMILY MEDICINE CLINIC | Age: 56
End: 2021-07-07

## 2021-07-08 ENCOUNTER — OFFICE VISIT (OUTPATIENT)
Dept: FAMILY MEDICINE CLINIC | Age: 56
End: 2021-07-08
Payer: COMMERCIAL

## 2021-07-08 VITALS
WEIGHT: 110 LBS | SYSTOLIC BLOOD PRESSURE: 111 MMHG | RESPIRATION RATE: 22 BRPM | HEART RATE: 85 BPM | BODY MASS INDEX: 20.24 KG/M2 | TEMPERATURE: 97 F | HEIGHT: 62 IN | DIASTOLIC BLOOD PRESSURE: 70 MMHG

## 2021-07-08 DIAGNOSIS — N30.00 ACUTE CYSTITIS WITHOUT HEMATURIA: ICD-10-CM

## 2021-07-08 DIAGNOSIS — D64.9 ANEMIA, UNSPECIFIED TYPE: ICD-10-CM

## 2021-07-08 DIAGNOSIS — Z09 HOSPITAL DISCHARGE FOLLOW-UP: Primary | ICD-10-CM

## 2021-07-08 DIAGNOSIS — F10.21 ALCOHOL USE DISORDER, MODERATE, IN EARLY REMISSION (HCC): ICD-10-CM

## 2021-07-08 DIAGNOSIS — G62.9 NEUROPATHY: ICD-10-CM

## 2021-07-08 DIAGNOSIS — M79.7 FIBROMYALGIA: ICD-10-CM

## 2021-07-08 DIAGNOSIS — R35.0 FREQUENCY OF URINATION: ICD-10-CM

## 2021-07-08 LAB
ANION GAP SERPL CALCULATED.3IONS-SCNC: 16 MMOL/L (ref 7–16)
ANISOCYTOSIS: ABNORMAL
BASOPHILS ABSOLUTE: 0.04 E9/L (ref 0–0.2)
BASOPHILS RELATIVE PERCENT: 0.9 % (ref 0–2)
BILIRUBIN, POC: ABNORMAL
BLOOD URINE, POC: NEGATIVE
BUN BLDV-MCNC: 11 MG/DL (ref 6–20)
CALCIUM SERPL-MCNC: 9.1 MG/DL (ref 8.6–10.2)
CHLORIDE BLD-SCNC: 103 MMOL/L (ref 98–107)
CLARITY, POC: CLEAR
CO2: 25 MMOL/L (ref 22–29)
COLOR, POC: ABNORMAL
CREAT SERPL-MCNC: 0.5 MG/DL (ref 0.5–1)
EOSINOPHILS ABSOLUTE: 0.07 E9/L (ref 0.05–0.5)
EOSINOPHILS RELATIVE PERCENT: 1.8 % (ref 0–6)
GFR AFRICAN AMERICAN: >60
GFR NON-AFRICAN AMERICAN: >60 ML/MIN/1.73
GLUCOSE BLD-MCNC: 98 MG/DL (ref 74–99)
GLUCOSE URINE, POC: ABNORMAL
HCT VFR BLD CALC: 27.8 % (ref 34–48)
HEMOGLOBIN: 8.6 G/DL (ref 11.5–15.5)
IRON SATURATION: 34 % (ref 15–50)
IRON: 78 MCG/DL (ref 37–145)
KETONES, POC: ABNORMAL
LEUKOCYTE EST, POC: ABNORMAL
LYMPHOCYTES ABSOLUTE: 0.94 E9/L (ref 1.5–4)
LYMPHOCYTES RELATIVE PERCENT: 24.3 % (ref 20–42)
MCH RBC QN AUTO: 34.8 PG (ref 26–35)
MCHC RBC AUTO-ENTMCNC: 30.9 % (ref 32–34.5)
MCV RBC AUTO: 112.6 FL (ref 80–99.9)
MONOCYTES ABSOLUTE: 0.04 E9/L (ref 0.1–0.95)
MONOCYTES RELATIVE PERCENT: 0.9 % (ref 2–12)
NEUTROPHILS ABSOLUTE: 2.81 E9/L (ref 1.8–7.3)
NEUTROPHILS RELATIVE PERCENT: 72.1 % (ref 43–80)
NITRITE, POC: POSITIVE
PDW BLD-RTO: 14.5 FL (ref 11.5–15)
PH, POC: 5
PLATELET # BLD: 177 E9/L (ref 130–450)
PMV BLD AUTO: 11.5 FL (ref 7–12)
POLYCHROMASIA: ABNORMAL
POTASSIUM SERPL-SCNC: 3.7 MMOL/L (ref 3.5–5)
PROTEIN, POC: ABNORMAL
RBC # BLD: 2.47 E12/L (ref 3.5–5.5)
SODIUM BLD-SCNC: 144 MMOL/L (ref 132–146)
SPECIFIC GRAVITY, POC: 1.01
TOTAL IRON BINDING CAPACITY: 229 MCG/DL (ref 250–450)
UROBILINOGEN, POC: ABNORMAL
WBC # BLD: 3.9 E9/L (ref 4.5–11.5)

## 2021-07-08 PROCEDURE — 1111F DSCHRG MED/CURRENT MED MERGE: CPT | Performed by: FAMILY MEDICINE

## 2021-07-08 PROCEDURE — 99495 TRANSJ CARE MGMT MOD F2F 14D: CPT | Performed by: FAMILY MEDICINE

## 2021-07-08 PROCEDURE — 81002 URINALYSIS NONAUTO W/O SCOPE: CPT | Performed by: FAMILY MEDICINE

## 2021-07-08 RX ORDER — NITROFURANTOIN 25; 75 MG/1; MG/1
100 CAPSULE ORAL 2 TIMES DAILY
Qty: 14 CAPSULE | Refills: 0 | Status: SHIPPED | OUTPATIENT
Start: 2021-07-08 | End: 2021-07-15

## 2021-07-08 RX ORDER — CHOLECALCIFEROL (VITAMIN D3) 125 MCG
CAPSULE ORAL
Qty: 30 TABLET | Refills: 3 | Status: SHIPPED | OUTPATIENT
Start: 2021-07-08

## 2021-07-08 RX ORDER — BACLOFEN 10 MG/1
TABLET ORAL
Qty: 30 TABLET | Refills: 0 | Status: SHIPPED | OUTPATIENT
Start: 2021-07-08

## 2021-07-08 RX ORDER — VALACYCLOVIR HYDROCHLORIDE 500 MG/1
TABLET, FILM COATED ORAL
Qty: 30 TABLET | Refills: 3 | Status: SHIPPED | OUTPATIENT
Start: 2021-07-08

## 2021-07-08 RX ORDER — THIAMINE MONONITRATE (VIT B1) 100 MG
100 TABLET ORAL DAILY
Qty: 30 TABLET | Refills: 3 | Status: SHIPPED | OUTPATIENT
Start: 2021-07-08

## 2021-07-08 RX ORDER — ALBUTEROL SULFATE 90 UG/1
2 AEROSOL, METERED RESPIRATORY (INHALATION) 4 TIMES DAILY PRN
Qty: 1 INHALER | Refills: 5 | Status: SHIPPED | OUTPATIENT
Start: 2021-07-08

## 2021-07-08 RX ORDER — FOLIC ACID 1 MG/1
1 TABLET ORAL DAILY
Qty: 90 TABLET | Refills: 1 | Status: SHIPPED | OUTPATIENT
Start: 2021-07-08

## 2021-07-08 ASSESSMENT — ENCOUNTER SYMPTOMS
SHORTNESS OF BREATH: 0
SINUS PRESSURE: 0
EYE PAIN: 0
CONSTIPATION: 0
DIARRHEA: 0
BACK PAIN: 0
COUGH: 0
SORE THROAT: 0
ABDOMINAL PAIN: 0

## 2021-07-08 NOTE — ADT AUTH CERT
Utilization Reviews       Dehydration - Care Day 3 (7/1/2021) by Carlos Eduardo Loya RN       Review Status Review Entered   Completed 7/8/2021 14:54      Criteria Review      Care Day: 3 Care Date: 7/1/2021 Level of Care: Intermediate Care    Guideline Day 2    Clinical Status    (X) * Hemodynamic stability    7/8/2021 2:54 PM EDT by Adali Salgado      121/60 100 17 Temp  98 95% room air    (X) * Mental status at baseline    7/8/2021 2:54 PM EDT by Alcon Taylor awake, alert, cooperative    (X) * Vomiting absent or controlled    (X) * Diarrhea absent or controlled    (X) * Electrolyte abnormalities absent or acceptable for next level of care    7/8/2021 2:54 PM EDT by Alcon Taylor Results for Germán Parker (MRN [id number]) as of 7/8/2021 14:52    7/1/2021 05:25  Sodium: 137  Potassium: 3.8  Chloride: 100  CO2: 22  BUN: 6  Creatinine: 0.3 (L)  Anion Gap: 15  GFR Non-: >60  GFR African American: >60  Magnesium: 1.9  Glucose:    ( ) * Cause of dehydration requiring inpatient treatment absent    ( ) * Renal function at baseline or acceptable for next level of care    ( ) * Discharge plans and education understood    Activity    (X) * Ambulatory or acceptable for next level of care    Routes    (X) * Oral hydration    7/8/2021 2:54 PM EDT by Adali Salgado      ADULT DIET; Regular [[id number]]    Adult Oral Nutrition Supplement; Standard High Calorie/High Protein Oral Supplement [DNS8]    (X) * Oral medications or regimen acceptable for next level of care    7/8/2021 2:54 PM EDT by Alcon Taylor see med list    (X) * Oral diet or acceptable for next level of care    7/8/2021 2:54 PM EDT by Rere Pablo DIET;  Regular [[id number]]   Adult Oral Nutrition Supplement; Standard High Calorie/High Protein Oral Supplement [DNS8]    Interventions    (X) Electrolytes    7/8/2021 2:54 PM EDT by Adali Salgado      potassium chloride 10 mEq/100 mL IVPB (Peripheral Line)   Dose: 10 mEq  Freq: EVERY HOUR Route: IV x 7     magnesium sulfate 1000 mg in dextrose 5% 100 mL IVPB   Dose: 1,000 mg  Freq: PRN Route: IV x 1    * Milestone   Additional Notes   7/1/21      IM Note:      Physical     VITALS:  BP (!) 106/52   Pulse 99   Temp 99 °F (37.2 °C) (Oral)   Resp 16   Wt 108 lb 12.8 oz (49.4 kg)   LMP 03/01/2001   SpO2 96%   BMI 19.90 kg/m²    CONSTITUTIONAL:  awake, alert, cooperative, no apparent distress, and appears stated age   EYES:  Lids and lashes normal, pupils equal, round and reactive to light, extra ocular muscles intact, sclera clear, conjunctiva normal   ENT:  Normocephalic, without obvious abnormality, atraumatic, sinuses nontender on palpation, external ears without lesions, oral pharynx with moist mucus membranes, tonsils without erythema or exudates, gums normal and good dentition. NECK:  Supple, symmetrical, trachea midline, no adenopathy, thyroid symmetric, not enlarged and no tenderness, skin normal   BACK:  Symmetric, no curvature, spinous processes are non-tender on palpation, paraspinous muscles are non-tender on palpation, no costal vertebral tenderness   LUNGS:  No increased work of breathing, good air exchange, clear to auscultation bilaterally, no crackles or wheezing   CARDIOVASCULAR:  Normal apical impulse, regular rate and rhythm, normal S1 and S2, no S3 or S4, and no murmur noted   ABDOMEN:  No scars, normal bowel sounds, soft, non-distended, non-tender, no masses palpated, no hepatosplenomegally   MUSCULOSKELETAL:  there is no redness, warmth, or swelling of the joints   NEUROLOGIC:  No focal neuro deficit   SKIN:  no bruising or bleeding         ASSESSMENT AND PLAN         1.  Dehydration       2. Metabolic acidosis       3.  Macrocytic Anemia\": possible etoh abuse related       4. Electrolyte Abnormality       5. Hypophosphatemia:       6.  Alcohol Abuse:        Plans:   Stop IV fluid after current bag   PT/OT   Alcohol withdrawal protocol   Check TSH, vit D and Vit B 12 and folate   Plan to discharge in am if still stable. Can go home with home PT. CM:      SOCIAL WORK / DISCHARGE PLANNING:   COVID testing not done. Sw met with pt at bedside, difficulty getting pt awake to complete assessment. Pt is focused on physical rehab and getting a walker or cane. She states she is going to stay at her sister, Sujey's home in [facility] but could not provide address. Pt lived alone in 2 story home, bed and bath upstairs, no dme. Denies hx of HHC/EVERT. She is interested in [address] [place], await therapy evals to assist with dc planning. No preference for [address] agencies. Sw attempted to discuss ETOH use and she was slightly agitated with discussion. She is open to speak to [facility] but made it clear she is not interested in inpatient treatment center. PCP Dr Laura Aleman. Referral called to Suge[facility] Wilson, [facility].        Addendum: 420pm - Sw attempted to review IMM letter with pt, again sleeping and unable to arouse. Will have CM assistant follow up next date.  Will need sister's name address and phone number to confirm plan to dc home with her as well if will need [address] arrangements.           SW:      Tentative placement recommendation: Home Health Physical Therapy    Equipment recommendation: [place] [facility]        Results for Christena Nissen (MRN [id number]) as of 7/8/2021 14:52      7/1/2021 05:25   Sodium: 137   Potassium: 3.8   Chloride: 100   CO2: 22   BUN: 6   Creatinine: 0.3 (L)   Anion Gap: 15   GFR Non-: >60   GFR African American: >60   Magnesium: 1.9   Glucose: 111 (H)   Calcium: 8.4 (L)   Phosphorus: 1.1 (L)   WBC: 2.8 (L)   RBC: 2.39 (L)   Hemoglobin Quant: 8.7 (L)   Hematocrit: 24.9 (L)   MCV: 104.2 (H)   MCH: 36.4 (H)   MCHC: 34.9 (H)   MPV: 10.8   RDW: 11.9   Platelet Count: 756 (L)      7/1/2021 14:43   TSH: 3.940   Vit D, 25-Hydroxy: 33   Folate: >20.0   Vitamin B-12: 702         enoxaparin (LOVENOX) injection 40 mg    Dose: 40 mg   Freq: DAILY Route: SC      folic acid (FOLVITE) tablet 1 mg    Dose: 1 mg   Freq: DAILY Route: PO      gabapentin (NEURONTIN) capsule 200 mg    Dose: 200 mg   Freq: 3 TIMES DAILY Route: PO         hydroxychloroquine (PLAQUENIL) tablet 200 mg    Dose: 200 mg   Freq: DAILY WITH BREAKFAST Route: PO      potassium chloride 10 mEq/100 mL IVPB (Peripheral Line)    Dose: 10 mEq   Freq: EVERY HOUR Route: IV x 2       potassium chloride 10 mEq/100 mL IVPB (Peripheral Line)    Dose: 10 mEq   Freq: EVERY HOUR Route: IV x 5       therapeutic multivitamin-minerals 1 tablet    Dose: 1 tablet   Freq: DAILY Route: PO      thiamine mononitrate tablet 100 mg    Dose: 100 mg   Freq: DAILY Route: PO x 1       acetaminophen (TYLENOL) tablet 650 mg    Dose: 650 mg   Freq: EVERY 6 HOURS PRN Route: PO x 1       acetaminophen (TYLENOL) suppository 650 mg    Dose: 650 mg   Freq: EVERY 6 HOURS PRN Route: RE x 1       magnesium sulfate 1000 mg in dextrose 5% 100 mL IVPB    Dose: 1,000 mg   Freq: PRN Route: IV x 1

## 2021-07-08 NOTE — PROGRESS NOTES
Post-Discharge Transitional Care Management Services or Hospital Follow Up      Evaristo Hammonds   YOB: 1965    Date of Office Visit:  7/8/2021  Date of Hospital Admission: 6/29/21  Date of Hospital Discharge: 7/2/21  Readmission Risk Score(high >=14%. Medium >=10%):Readmission Risk Score: 20      Care management risk score Rising risk (score 2-5) and Complex Care (Scores >=6): 0     Non face to face  following discharge, date last encounter closed (first attempt may have been earlier): 7/6/2021 12:51 PM 7/6/2021 12:51 PM    Call initiated 2 business days of discharge: Yes     Patient Active Problem List   Diagnosis    Tendinitis of elbow    Atypical chest pain    Tobacco abuse    Vitamin D deficiency    Lateral epicondylitis    Fibromyalgia    Recurrent cold sores    Dehydration    Metabolic acidosis       Allergies   Allergen Reactions    Penicillins Hives    Simvastatin Other (See Comments)       Medications listed as ordered at the time of discharge from hospital   Drena Aquas   Home Medication Instructions TRENT:    Printed on:07/08/21 6280   Medication Information                      albuterol sulfate HFA (VENTOLIN HFA) 108 (90 Base) MCG/ACT inhaler  Inhale 2 puffs into the lungs 4 times daily as needed for Wheezing             baclofen (LIORESAL) 10 MG tablet  Take one tab po bid prn for spasms             Cholecalciferol (VITAMIN D3) 50 MCG (2000 UT) TABS  TK 1 T PO QD             folic acid (FOLVITE) 1 MG tablet  Take 1 tablet by mouth daily             gabapentin (NEURONTIN) 100 MG capsule  TK 2 CS PO TID             hydroxychloroquine (PLAQUENIL) 200 MG tablet               Misc.  Devices MISC  Shower chair             nitrofurantoin, macrocrystal-monohydrate, (MACROBID) 100 MG capsule  Take 1 capsule by mouth 2 times daily for 7 days             valACYclovir (VALTREX) 500 MG tablet  Take one tab po daily             vitamin B-1 (THIAMINE) 100 MG tablet  Take 1 tablet by mouth daily                   Medications marked \"taking\" at this time  Outpatient Medications Marked as Taking for the 7/8/21 encounter (Office Visit) with George Tisha, DO   Medication Sig Dispense Refill    albuterol sulfate HFA (VENTOLIN HFA) 108 (90 Base) MCG/ACT inhaler Inhale 2 puffs into the lungs 4 times daily as needed for Wheezing 1 Inhaler 5    baclofen (LIORESAL) 10 MG tablet Take one tab po bid prn for spasms 30 tablet 0    Cholecalciferol (VITAMIN D3) 50 MCG (2000 UT) TABS TK 1 T PO QD 30 tablet 3    valACYclovir (VALTREX) 500 MG tablet Take one tab po daily 30 tablet 3    vitamin B-1 (THIAMINE) 100 MG tablet Take 1 tablet by mouth daily 30 tablet 3    folic acid (FOLVITE) 1 MG tablet Take 1 tablet by mouth daily 90 tablet 1    nitrofurantoin, macrocrystal-monohydrate, (MACROBID) 100 MG capsule Take 1 capsule by mouth 2 times daily for 7 days 14 capsule 0    Misc. Devices MISC Shower chair 1 Device 2    hydroxychloroquine (PLAQUENIL) 200 MG tablet       gabapentin (NEURONTIN) 100 MG capsule TK 2 CS PO TID  3        Medications patient taking as of now reconciled against medications ordered at time of hospital discharge: Yes    Chief Complaint   Patient presents with    Care Management       HPI    Inpatient course: Discharge summary reviewed- see chart. Interval history/Current status:   Patient presents for follow-up. She was admitted to the hospital on 629 for dehydration and metabolic acidosis. She was also on alcohol withdrawal.  She states she was drinking a pint of vodka on a daily basis. She has since stopped drinking any alcohol. She states she still having some issues with walking and ataxia. She also has some pain in her feet. She does admit to neuropathy. She was admitted to the hospital until 7/2. She was started on thiamine as well as folic acid in the hospital.  She has been taking this as an outpatient. She does admit to increasing her fluid intake.   She does not Breath sounds: Normal breath sounds. No wheezing. Abdominal:      Palpations: Abdomen is soft. Tenderness: There is no abdominal tenderness. Musculoskeletal:         General: No tenderness. Cervical back: Normal range of motion and neck supple. Comments: Decreased range of motion throughout. Gait is slow   Lymphadenopathy:      Cervical: No cervical adenopathy. Skin:     General: Skin is warm and dry. Findings: No rash. Neurological:      Mental Status: She is alert and oriented to person, place, and time. Deep Tendon Reflexes: Reflexes are normal and symmetric. Assessment/Plan:  1. Hospital discharge follow-up  - [place] DISCHARGE MEDS RECONCILED W/ CURRENT OUTPATIENT MED LIST    2. Alcohol use disorder, moderate, in early remission ([facility] [address]  Continue thiamine and folic acid supplementation. Will obtain BMP to further evaluate. Home care ordered as well  - BASIC METABOLIC PANEL; Future  - [facility]    3. Fibromyalgia  - Shower chair  - [facility]    4. Neuropathy  Home care ordered as above  - Shower [facility]  - [facility]    5. Anemia, unspecified type  Repeat CBC and iron levels  - CBC Auto Differential; Future  - BASIC METABOLIC PANEL; Future  - Iron and TIBC; Future    6. Frequency of urination  - POCT Urinalysis no Micro    7. Acute cystitis without hematuria  Urinalysis positive we will send out for culture and treat with Macrobid. Side effects medication reviewed patient. - Culture, Urine;  Future        Medical Decision Making: moderate complexity    Did personally review all labs, imaging, notes from hospitalization

## 2021-07-09 ENCOUNTER — TELEPHONE (OUTPATIENT)
Dept: FAMILY MEDICINE CLINIC | Age: 56
End: 2021-07-09

## 2021-07-10 LAB — URINE CULTURE, ROUTINE: NORMAL

## 2021-07-12 ENCOUNTER — TELEPHONE (OUTPATIENT)
Dept: FAMILY MEDICINE CLINIC | Age: 56
End: 2021-07-12

## 2021-07-12 DIAGNOSIS — M79.7 FIBROMYALGIA: ICD-10-CM

## 2021-07-12 DIAGNOSIS — R53.81 PHYSICAL DECONDITIONING: ICD-10-CM

## 2021-07-12 DIAGNOSIS — G62.9 NEUROPATHY: Primary | ICD-10-CM

## 2021-07-14 ENCOUNTER — TELEPHONE (OUTPATIENT)
Dept: FAMILY MEDICINE CLINIC | Age: 56
End: 2021-07-14

## 2021-07-15 ENCOUNTER — TELEPHONE (OUTPATIENT)
Dept: FAMILY MEDICINE CLINIC | Age: 56
End: 2021-07-15

## 2021-07-15 DIAGNOSIS — G62.9 NEUROPATHY: ICD-10-CM

## 2021-07-15 DIAGNOSIS — M79.7 FIBROMYALGIA: Primary | ICD-10-CM

## 2021-07-15 RX ORDER — FUROSEMIDE 20 MG/1
20 TABLET ORAL DAILY PRN
Qty: 30 TABLET | Refills: 5 | Status: SHIPPED | OUTPATIENT
Start: 2021-07-15

## 2021-07-15 RX ORDER — POTASSIUM CHLORIDE 750 MG/1
10 TABLET, EXTENDED RELEASE ORAL DAILY
Qty: 30 TABLET | Refills: 5 | Status: SHIPPED | OUTPATIENT
Start: 2021-07-15

## 2021-07-15 NOTE — TELEPHONE ENCOUNTER
Need to know name of provider she wishes to be referred to. I am not aware of a dr Morrow Marker. Will start lasix as needed with potassium for swelling.  Call in one week with an update

## 2021-07-15 NOTE — TELEPHONE ENCOUNTER
Pt calling and states she is still having pain in her feet and swelling which is getting worse. She states it is affecting the top of her feet and toes now. She went  to a provider at one time she thinks his name was Dr Alfred Gale (?), but it has been awhile and she would need a new referral to see him. Please advise.

## 2021-07-30 PROBLEM — E86.0 DEHYDRATION: Status: RESOLVED | Noted: 2021-06-30 | Resolved: 2021-07-30

## 2021-08-10 ENCOUNTER — TELEPHONE (OUTPATIENT)
Dept: FAMILY MEDICINE CLINIC | Age: 56
End: 2021-08-10

## 2021-08-10 NOTE — TELEPHONE ENCOUNTER
----- Message from [facility] sent at 8/10/2021  8:51 AM EDT -----  Subject: Message to Provider    QUESTIONS  Information for Provider? patient called to cancel her appointment due to   being able to walk, she wanted to inform Dr. Gregorio Hutton that she is going to   have her nerve test done this Thursday.   ---------------------------------------------------------------------------  --------------  [address]  What is the best way for the office to contact you? OK to leave message on   voicemail  Preferred Call Back Phone Number? [phone]  ---------------------------------------------------------------------------  --------------  SCRIPT ANSWERS  Relationship to Patient?  Self

## 2021-08-13 ENCOUNTER — TELEPHONE (OUTPATIENT)
Dept: FAMILY MEDICINE CLINIC | Age: 56
End: 2021-08-13

## 2021-08-13 DIAGNOSIS — R20.2 NUMBNESS AND TINGLING OF BOTH FEET: Primary | ICD-10-CM

## 2021-08-13 DIAGNOSIS — R20.0 NUMBNESS AND TINGLING OF BOTH FEET: Primary | ICD-10-CM

## 2021-08-13 DIAGNOSIS — R09.89 OTHER SPECIFIED SYMPTOMS AND SIGNS INVOLVING THE CIRCULATORY AND RESPIRATORY SYSTEMS: ICD-10-CM

## 2021-08-13 NOTE — TELEPHONE ENCOUNTER
----- Message from Amy Alejo sent at 8/13/2021 10:33 AM EDT -----  Subject: Message to Provider    QUESTIONS  Information for Provider? Patient went to Dr. Cynthia Ferreira yesterday, and he said   that it not a nerve issue, and he thinks it's vascular, and because of the   Fibromyalgia. He suggests that she sees a vascular doctor. ---------------------------------------------------------------------------  --------------  Mitchel LIMA  What is the best way for the office to contact you? OK to leave message on   voicemail  Preferred Call Back Phone Number? [phone]  ---------------------------------------------------------------------------  --------------  SCRIPT ANSWERS  Relationship to Patient?  Self

## 2021-08-20 ENCOUNTER — HOSPITAL ENCOUNTER (OUTPATIENT)
Dept: INTERVENTIONAL RADIOLOGY/VASCULAR | Age: 56
Discharge: HOME | End: 2021-08-22
Payer: COMMERCIAL

## 2021-08-20 DIAGNOSIS — R20.2 NUMBNESS AND TINGLING OF BOTH FEET: ICD-10-CM

## 2021-08-20 DIAGNOSIS — R20.0 NUMBNESS AND TINGLING OF BOTH FEET: ICD-10-CM

## 2021-08-20 DIAGNOSIS — R09.89 OTHER SPECIFIED SYMPTOMS AND SIGNS INVOLVING THE CIRCULATORY AND RESPIRATORY SYSTEMS: ICD-10-CM

## 2021-08-20 PROCEDURE — 93922 UPR/L XTREMITY ART 2 LEVELS: CPT

## 2021-08-26 ENCOUNTER — OFFICE VISIT (OUTPATIENT)
Dept: FAMILY MEDICINE CLINIC | Age: 56
End: 2021-08-26
Payer: COMMERCIAL

## 2021-08-26 VITALS
WEIGHT: 100.8 LBS | OXYGEN SATURATION: 99 % | BODY MASS INDEX: 18.55 KG/M2 | DIASTOLIC BLOOD PRESSURE: 79 MMHG | RESPIRATION RATE: 22 BRPM | HEART RATE: 93 BPM | TEMPERATURE: 97.3 F | SYSTOLIC BLOOD PRESSURE: 128 MMHG | HEIGHT: 62 IN

## 2021-08-26 DIAGNOSIS — M79.7 FIBROMYALGIA: ICD-10-CM

## 2021-08-26 DIAGNOSIS — F10.21 ALCOHOL USE DISORDER, MODERATE, IN EARLY REMISSION (HCC): ICD-10-CM

## 2021-08-26 DIAGNOSIS — R60.0 BILATERAL LOWER EXTREMITY EDEMA: ICD-10-CM

## 2021-08-26 DIAGNOSIS — D64.9 ANEMIA, UNSPECIFIED TYPE: Primary | ICD-10-CM

## 2021-08-26 PROCEDURE — 99213 OFFICE O/P EST LOW 20 MIN: CPT | Performed by: FAMILY MEDICINE

## 2021-08-26 PROCEDURE — 3017F COLORECTAL CA SCREEN DOC REV: CPT | Performed by: FAMILY MEDICINE

## 2021-08-26 PROCEDURE — 4004F PT TOBACCO SCREEN RCVD TLK: CPT | Performed by: FAMILY MEDICINE

## 2021-08-26 PROCEDURE — G8419 CALC BMI OUT NRM PARAM NOF/U: HCPCS | Performed by: FAMILY MEDICINE

## 2021-08-26 PROCEDURE — G8427 DOCREV CUR MEDS BY ELIG CLIN: HCPCS | Performed by: FAMILY MEDICINE

## 2021-08-26 NOTE — PROGRESS NOTES
Sarita Roman  : 1965    Chief Complaint:     Chief Complaint   Patient presents with    Foot Swelling      wilner feet swelling x 2 months; podiatrist called in steroid and will start that today       HPI  Sarita Roman 60 y.o. presents for   Chief Complaint   Patient presents with    Foot Swelling      wilner feet swelling x 2 months; podiatrist called in steroid and will start that today     Patient presents for follow up today. She continues to have issues with swelling in her feet but this is improving. She has not tried compression stockings. She does admit to continue to drink alcohol she has tried to decrease intake. She also was slightly anemic on last labs. She has improved her diet. All questions were answered to patients satisfaction.     Past Medical History:   Diagnosis Date    Arthritis     Atypical chest pain 2011    Fibromyalgia     Lateral epicondylitis  of elbow 2011    Tendinitis of elbow 2011    Tobacco abuse 2011    Vitamin D deficiency 2011       Past Surgical History:   Procedure Laterality Date    APPENDECTOMY      TUBAL LIGATION Bilateral     age 26       Social History     Socioeconomic History    Marital status:      Spouse name: None    Number of children: None    Years of education: None    Highest education level: None   Occupational History    None   Tobacco Use    Smoking status: Current Every Day Smoker     Packs/day: 0.50     Years: 30.00     Pack years: 15.00     Types: Cigarettes    Smokeless tobacco: Never Used   Substance and Sexual Activity    Alcohol use: Yes     Comment: occasional beer    Drug use: No    Sexual activity: None   Other Topics Concern    None   Social History Narrative    None     Social Determinants of Health     Financial Resource Strain: Low Risk     Difficulty of Paying Living Expenses: Not hard at all   Food Insecurity: No Food Insecurity    Worried About Running Out of Food in the Last Year: Never true    Ran Out of Food in the Last Year: Never true   Transportation Needs: No Transportation Needs    Lack of Transportation (Medical): No    Lack of Transportation (Non-Medical): No   Physical Activity:     Days of Exercise per Week:     Minutes of Exercise per Session:    Stress:     Feeling of Stress :    Social Connections:     Frequency of Communication with Friends and Family:     Frequency of Social Gatherings with Friends and Family:     Attends Jain Services:     Active Member of Clubs or Organizations:     Attends Club or Organization Meetings:     Marital Status:    Intimate Partner Violence:     Fear of Current or Ex-Partner:     Emotionally Abused:     Physically Abused:     Sexually Abused:        Family History   Problem Relation Age of Onset    Other Mother 48         of MI at age of 48, Lupus          Current Outpatient Medications   Medication Sig Dispense Refill    furosemide (LASIX) 20 MG tablet Take 1 tablet by mouth daily as needed (swelling) 30 tablet 5    potassium chloride (KLOR-CON M) 10 MEQ extended release tablet Take 1 tablet by mouth daily 30 tablet 5    Misc. Devices MISC Shower chair 1 Device 2    albuterol sulfate HFA (VENTOLIN HFA) 108 (90 Base) MCG/ACT inhaler Inhale 2 puffs into the lungs 4 times daily as needed for Wheezing 1 Inhaler 5    baclofen (LIORESAL) 10 MG tablet Take one tab po bid prn for spasms 30 tablet 0    Cholecalciferol (VITAMIN D3) 50 MCG (2000 UT) TABS TK 1 T PO QD 30 tablet 3    valACYclovir (VALTREX) 500 MG tablet Take one tab po daily 30 tablet 3    vitamin B-1 (THIAMINE) 100 MG tablet Take 1 tablet by mouth daily 30 tablet 3    folic acid (FOLVITE) 1 MG tablet Take 1 tablet by mouth daily 90 tablet 1    hydroxychloroquine (PLAQUENIL) 200 MG tablet       gabapentin (NEURONTIN) 100 MG capsule TK 2 CS PO TID  3     No current facility-administered medications for this visit.        Allergies   Allergen Reactions    Penicillins Hives    Simvastatin Other (See Comments)       Health Maintenance Due   Topic Date Due    Hepatitis C screen  Never done    COVID-19 Vaccine (1) Never done    HIV screen  Never done    Cervical cancer screen  Never done    Colon cancer screen colonoscopy  Never done    Pneumococcal 0-64 years Vaccine (1 of 2 - PPSV23) 07/13/2017    Annual Wellness Visit (AWV)  Never done    Lipid screen  10/03/2019           REVIEW OF SYSTEMS  Review of Systems   Constitutional: Positive for fatigue. Negative for fever. HENT: Negative for ear pain, sinus pressure, sneezing and sore throat. Eyes: Negative for pain. Respiratory: Negative for cough and shortness of breath. Cardiovascular: Positive for leg swelling. Negative for chest pain. Gastrointestinal: Negative for abdominal pain, constipation and diarrhea. Genitourinary: Negative for dysuria, frequency and urgency. Musculoskeletal: Positive for arthralgias. Negative for back pain, gait problem and myalgias. Skin: Negative for rash. Allergic/Immunologic: Negative for food allergies. Neurological: Negative for light-headedness and headaches. Hematological: Does not bruise/bleed easily. Psychiatric/Behavioral: Negative for behavioral problems and sleep disturbance. PHYSICAL EXAM  /79 (Site: Right Upper Arm, Position: Sitting, Cuff Size: Medium Adult)   Pulse 93   Temp 97.3 °F (36.3 °C)   Resp 22   Ht 5' 2\" (1.575 m)   Wt 100 lb 12.8 oz (45.7 kg)   LMP 03/01/2001   SpO2 99%   BMI 18.44 kg/m²   Physical Exam  Vitals and nursing note reviewed. Constitutional:       Appearance: She is well-developed. HENT:      Head: Normocephalic and atraumatic. Right Ear: External ear normal.      Left Ear: External ear normal.      Nose: Nose normal.   Eyes:      Conjunctiva/sclera: Conjunctivae normal.   Neck:      Thyroid: No thyromegaly. Cardiovascular:      Rate and Rhythm: Normal rate and regular rhythm. Heart sounds: Normal heart sounds. Pulmonary:      Effort: Pulmonary effort is normal.      Breath sounds: Normal breath sounds. No wheezing. Abdominal:      Palpations: Abdomen is soft. Tenderness: There is no abdominal tenderness. Musculoskeletal:         General: No tenderness. Cervical back: Normal range of motion and neck supple. Comments: Decreased range of motion throughout. Lymphadenopathy:      Cervical: No cervical adenopathy. Skin:     General: Skin is warm and dry. Findings: No rash. Neurological:      Mental Status: She is alert and oriented to person, place, and time. Deep Tendon Reflexes: Reflexes are normal and symmetric. Laboratory: All laboratory and radiology results have been personally reviewed by myself    Lab Results   Component Value Date     08/26/2021    K 4.4 08/26/2021    K 3.8 07/01/2021    CL 92 08/26/2021    CO2 22 08/26/2021    BUN 3 08/26/2021    CREATININE 0.3 08/26/2021    PROT 6.6 08/26/2021    LABALBU 3.9 08/26/2021    CALCIUM 8.7 08/26/2021    GFRAA >60 08/26/2021    LABGLOM >60 08/26/2021    GLUCOSE 66 08/26/2021    AST 92 08/26/2021    ALT 28 08/26/2021    ALKPHOS 139 08/26/2021    BILITOT 0.6 08/26/2021    TSH 3.940 07/01/2021    VITD25 33 07/01/2021    CHOL 234 10/03/2014    TRIG 209 10/03/2014    HDL 79 10/03/2014    LDLCALC 113 10/03/2014        Lab Results   Component Value Date    CHOL 234 (H) 10/03/2014     Lab Results   Component Value Date    TRIG 209 (H) 10/03/2014     Lab Results   Component Value Date    HDL 79 10/03/2014     Lab Results   Component Value Date    LDLCALC 113 (H) 10/03/2014       No results found for: LABA1C  Lab Results   Component Value Date    LDLCALC 113 (H) 10/03/2014    CREATININE 0.3 (L) 08/26/2021       ASSESSMENT/PLAN:     Diagnosis Orders   1. Anemia, unspecified type  CBC Auto Differential   2.  Alcohol use disorder, moderate, in early remission ([facility] [address]  COMPREHENSIVE METABOLIC PANEL   3. Fibromyalgia     4. Bilateral lower extremity edema  Compression Stocking     Will repeat cbc due to anemia. As for alcohol we had a long discussion today she understands the need for alcohol cessation. She is agreeable to cut back for better health. As for LE edema will add compression stockings. No other changes to be made at this time. Problem list reviewed andsimplified/updated  HM reviewed today and counseled as appropriate    Call or go to ED immediately if symptoms worsen or persist.  Future Appointments   Date Time Provider Adam Root   11/30/2021  2:30 PM DO Kaylynn Espinofilemon [facility]     Or sooner if necessary. Educational materials and/or homeexercises printed for patient's review and were included in patient instructions on his/her After Visit Summary and given to patient at the end of visit.       Counseled regarding above diagnosis, including possible risks and complications,  especially if left uncontrolled.     Counseled regarding the possible side effects, risks, benefits and alternatives to treatment; patient and/or guardian verbalizes understanding, agrees,feels comfortable with and wishes to proceed with above treatment plan.     Advised patient to call [facility] new medication issues, and read all Rx info from pharmacy to assure aware of all possible risks and side effects of medication before taking.     Reviewed age and gender appropriate health screening exams and vaccinations. Advised patient regarding importance of keeping up with recommended health maintenance and toschedule as soon as possible if overdue, as this is important in assessing for undiagnosed pathology, especially cancer, as well as protecting against potentially harmful/life threatening disease.          Patient and/or guardian verbalizes understanding and agrees with above counseling, assessment and plan.     All questions answered.     Gianni Vanegas DO  8/26/21    NOTE: This report

## 2021-08-30 ASSESSMENT — ENCOUNTER SYMPTOMS
COUGH: 0
SINUS PRESSURE: 0
BACK PAIN: 0
SHORTNESS OF BREATH: 0
CONSTIPATION: 0
ABDOMINAL PAIN: 0
DIARRHEA: 0
EYE PAIN: 0
SORE THROAT: 0
